# Patient Record
Sex: MALE | Race: WHITE | NOT HISPANIC OR LATINO | Employment: UNEMPLOYED | URBAN - METROPOLITAN AREA
[De-identification: names, ages, dates, MRNs, and addresses within clinical notes are randomized per-mention and may not be internally consistent; named-entity substitution may affect disease eponyms.]

---

## 2019-04-01 ENCOUNTER — OFFICE VISIT (OUTPATIENT)
Dept: FAMILY MEDICINE | Facility: CLINIC | Age: 29
End: 2019-04-01
Payer: COMMERCIAL

## 2019-04-01 VITALS
RESPIRATION RATE: 14 BRPM | WEIGHT: 227 LBS | HEART RATE: 80 BPM | HEIGHT: 73 IN | BODY MASS INDEX: 30.09 KG/M2 | OXYGEN SATURATION: 99 % | SYSTOLIC BLOOD PRESSURE: 156 MMHG | TEMPERATURE: 98.8 F | DIASTOLIC BLOOD PRESSURE: 110 MMHG

## 2019-04-01 DIAGNOSIS — R59.1 LYMPHADENOPATHY: ICD-10-CM

## 2019-04-01 DIAGNOSIS — R22.9 SUBCUTANEOUS NODULES: ICD-10-CM

## 2019-04-01 DIAGNOSIS — I10 ESSENTIAL HYPERTENSION, BENIGN: Primary | ICD-10-CM

## 2019-04-01 PROCEDURE — 99214 OFFICE O/P EST MOD 30 MIN: CPT | Performed by: FAMILY MEDICINE

## 2019-04-01 RX ORDER — ATENOLOL AND CHLORTHALIDONE TABLET 50; 25 MG/1; MG/1
1 TABLET ORAL DAILY
Qty: 30 TABLET | Refills: 1 | Status: SHIPPED | OUTPATIENT
Start: 2019-04-01 | End: 2019-07-05

## 2019-04-01 ASSESSMENT — MIFFLIN-ST. JEOR: SCORE: 2048.55

## 2019-04-01 ASSESSMENT — PAIN SCALES - GENERAL: PAINLEVEL: NO PAIN (0)

## 2019-04-01 NOTE — PROGRESS NOTES
SUBJECTIVE:   Wally Flores is a 28 year old male who presents to clinic today for the following health issues:    Hypertension Follow-up      Outpatient blood pressures are being checked at home.  Results are 150/110.    Low Salt Diet: no added salt      Amount of exercise or physical activity: 2-3 days/week for an average of greater than 60 minutes    Problems taking medications regularly: Not now not on meds because he didn't have insurance but now has insurance    Medication side effects: none    Diet: regular (no restrictions)      Wally is here for two concerns. First is hypertension. Was first diagnosed with hypertension about ten years ago. Was on a few different medications including propranolol, hydrochlorothiazide, and lisinopril but all at low doses. Has not been on medication for about a year due to lack of insurance. He has a strong family history of hypertension in both of his parents and all 3 of his brothers. No headaches, dizziness, or vision changes.  No chest pain or shortness of breath.  No leg swelling, orthopnea or dyspnea.  Does not check her blood pressure.    Also concerned about lumps on his head. The first is a lump at the front of his left ear. Has had it for three or four years. Fluctuates in size. Has been drained twice and spontaneously burst once. Is currently small in size but . No drainage currently. Also has had a lump on the right side of his neck under his chin for a few weeks.  No change in size or color.  Sore with palpation otherwise does not really bothered him.   Also has a small lump on the top of his head for about 2 years.  It also  gets larger and turns red at times. Currently it is small and firm. No fevers or chills.  No headache or dizziness.    Problem list and histories reviewed & adjusted, as indicated.  Additional history: as documented    Current Outpatient Medications   Medication Sig Dispense Refill     atenolol-chlorthalidone (TENORETIC)  "50-25 MG tablet Take 1 tablet by mouth daily 30 tablet 1     Allergies   Allergen Reactions     Penicillins Rash and Hives     Acetaminophen Rash       Reviewed and updated as needed this visit by clinical staff  Tobacco  Allergies  Meds  Soc Hx      Reviewed and updated as needed this visit by Provider         ROS:  Constitutional, HEENT, cardiovascular, pulmonary, gi and gu systems are negative, except as otherwise noted.    OBJECTIVE:     BP (!) 156/110 (BP Location: Left arm, Patient Position: Sitting, Cuff Size: Adult Regular)   Pulse 80   Temp 98.8  F (37.1  C) (Temporal)   Resp 14   Ht 1.854 m (6' 1\")   Wt 103 kg (227 lb)   SpO2 99%   BMI 29.95 kg/m    Body mass index is 29.95 kg/m .  GENERAL: healthy, alert and no distress  HENT: ear canals and TM's normal. Small fingertip sized fluctuant mass just anterior to left auricle, tender to palpation, no erythema or drainage.  Non-mobile. A small, less than 0.5 cm mass on top of scalp on the right side.  It is firm, non-tender to palpation, no erythema or drainage.  NECK: supple with no thyromegaly. Left sided shoddy lymphadenopathy, just below the mandible palpated.  RESP: lungs clear to auscultation - no rales, rhonchi or wheezes  CV: regular rate and rhythm, no murmur, no peripheral edema  MS: no gross musculoskeletal defects noted, no peripheral edema.  No focal weakness.    Diagnostic Test Results:  none    ASSESSMENT/PLAN:     1. Essential hypertension, benign  Patient with 10-year history of hypertension and strong family history of hypertension. Has been on multiple medications in the past including lisinopril, hydrochlorothiazide, and propranolol but all were at low doses. Didn't think they made any difference with his blood pressure. Started on atenolol/chlorthalidone with blood pressure re-check in about a week. If blood pressure is unchanged with the medication, will workup for other causes of hypertension including renal artery stenosis.  In " the meantime, recommend to work on weight loss, daily exercise, low-salt diet and avoid high caffeine intake.    - atenolol-chlorthalidone (TENORETIC) 50-25 MG tablet; Take 1 tablet by mouth daily  Dispense: 30 tablet; Refill: 1    2. Subcutaneous nodules  Patient with periauricular subcutaneous mass located just anterior to left auricle of 3 years duration. Per patient, has been drained twice and spontaneously drained once. 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 No signs of current infection. Refer to surgery for possible excision.  Also has subcutaneous mass in scalp on the right side of the top of his head that is small and firm to palpation. Likely an obstructed hair follicle. Reassure and monitor for now.       - GENERAL SURG ADULT REFERRAL    3. Lymphadenopathy  Patient with right-sided anterior cervical lymphadenopathy of a few weeks duration likely related to the periauricular subcutaneous mass. Expect to resolve. Call in if not resolve in the next couple weeks; will consider further evaluation with ultrasound or CT scan.     Aruna Srinivasan, MS3  [facility]    I, Aruna Srinivasan, am serving as a scribe; to document services personally performed by Tammie Driscoll Mai, MD- based on data collection and the provider's statements to me.      Tammie Driscoll Mai, MD  [facility]

## 2019-04-03 ENCOUNTER — OFFICE VISIT (OUTPATIENT)
Dept: SURGERY | Facility: CLINIC | Age: 29
End: 2019-04-03
Payer: COMMERCIAL

## 2019-04-03 VITALS
TEMPERATURE: 97.2 F | HEIGHT: 73 IN | BODY MASS INDEX: 29.69 KG/M2 | WEIGHT: 224 LBS | SYSTOLIC BLOOD PRESSURE: 114 MMHG | DIASTOLIC BLOOD PRESSURE: 62 MMHG

## 2019-04-03 DIAGNOSIS — R22.0 SUBCUTANEOUS MASS OF HEAD: Primary | ICD-10-CM

## 2019-04-03 PROCEDURE — 99243 OFF/OP CNSLTJ NEW/EST LOW 30: CPT | Performed by: SURGERY

## 2019-04-03 ASSESSMENT — MIFFLIN-ST. JEOR: SCORE: 2034.94

## 2019-04-03 NOTE — PROGRESS NOTES
Patient seen in consultation for recurrent infected skin cysts By Dr Paul    HPI:  Patient is a 28 year old male with several year history of scalp lumps that have waxed and waned in size and one that has had to be drained in the past due to infection. He has had a cyst removed from his back and a serious spinal abscess that required surgery many years ago. Currently the two spots that are recurrent and painful are on his left pre-auricular area and right frontal scalp. Per the patient these are not inflamed at the present time and he hasn't been on antibiotics for them for about an year.    Review Of Systems    Skin: as above  Ears/Nose/Throat: negative  Respiratory: No shortness of breath, dyspnea on exertion, cough, or hemoptysis  Cardiovascular: negative  Gastrointestinal: negative  Genitourinary: negative  Musculoskeletal: negative  Neurologic: negative  Hematologic/Lymphatic/Immunologic: negative  Endocrine: negative      Past Medical History:   Diagnosis Date     Herpes zoster without mention of complication 6th grade    left thorax     Hypertension      MEDICAL HISTORY OF -     Chromosomal [id number]     Ostium secundum type atrial septal defect     atrial septal defect     Other peripheral vascular disease(443.89)     History of acrocyanosis with circumoral pallor     Tuberculous abscess of spinal cord, 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    Staph aureus epidural abscess     Unspecified otitis media     Recurrent otitis     Varicella without mention of complication 1989       Past Surgical History:   Procedure Laterality Date     ADENOIDECTOMY  04/08/94     HC SPINAL PUNCTURE, THERAPEUTIC DRAINAGE CEREBROSPINAL FLUID  4/05    Drain Staph aureus epidural abscess     PE TUBES  04/08/94     TONSILLECTOMY  12/16/97     TYMPANOPLASTY, RT/LT  12/16/97    Tympanoplasty RT/LT       Family History   Problem Relation Age of Onset     Diabetes Mother      Hypertension Mother      Cancer Maternal Grandmother       Cancer - colorectal Maternal Grandfather      Cardiovascular Paternal Grandfather          at 32 from MI     No Known Problems Paternal Grandmother      Hypertension Brother      Coronary Artery Disease Father 50     Heart Disease Father      Hypertension Father      Diabetes Other         PGGM     Hypertension Brother      Hypertension Brother        Social History     Socioeconomic History     Marital status: Single     Spouse name: Not on file     Number of children: Not on file     Years of education: Not on file     Highest education level: Not on file   Occupational History     Occupation: student   Social Needs     Financial resource strain: Not on file     Food insecurity:     Worry: Not on file     Inability: Not on file     Transportation needs:     Medical: Not on file     Non-medical: Not on file   Tobacco Use     Smoking status: Never Smoker     Smokeless tobacco: Never Used   Substance and Sexual Activity     Alcohol use: Yes     Alcohol/week: 0.0 oz     Comment: 2 times per week./quit 15     Drug use: No     Sexual activity: Not Currently     Comment: Single.  not working.    Lifestyle     Physical activity:     Days per week: Not on file     Minutes per session: Not on file     Stress: Not on file   Relationships     Social connections:     Talks on phone: Not on file     Gets together: Not on file     Attends [employer]ism service: Not on file     Active member of club or organization: Not on file     Attends meetings of clubs or organizations: Not on file     Relationship status: Not on file     Intimate partner violence:     Fear of current or ex partner: Not on file     Emotionally abused: Not on file     Physically abused: Not on file     Forced sexual activity: Not on file   Other Topics Concern      Service No     Blood Transfusions No     Caffeine Concern No     Occupational Exposure No     Hobby Hazards No     Sleep Concern No     Stress Concern No     Weight Concern No      "Special Diet No     Back Care No     Exercise Yes     Bike Helmet No     Seat Belt Yes     Self-Exams Not Asked     Parent/sibling w/ CABG, MI or angioplasty before 65F 55M? Not Asked   Social History Narrative     Not on file       Current Outpatient Medications   Medication Sig Dispense Refill     atenolol-chlorthalidone (TENORETIC) 50-25 MG tablet Take 1 tablet by mouth daily 30 tablet 1       Medications and history reviewed    Physical exam:  Vitals: /62   Temp 97.2  F (36.2  C) (Temporal)   Ht 1.854 m (6' 1\")   Wt 101.6 kg (224 lb)   BMI 29.55 kg/m    BMI= Body mass index is 29.55 kg/m .    Constitutional: Healthy, alert, non-distressed   Head: Normo-cephalic, atraumatic, left pre-auricular area with firm small and non infected nodules, likely small cyst(s). Right frontal scalp with same, no signs of erythema or infection  Cardiovascular: RRR, no new murmurs, +S1, +S2 heart sounds, no clicks, rubs or gallops   Respiratory: CTAB, no rales, rhonchi or wheezing, equal chest rise, good respiratory effort   Gastrointestinal: Soft, non-tender, non distended, no rebound rigidity or guarding, no masses or hernias palpated   : Deferred  Musculoskeletal: Moves all extremities, normal  strength, no deformities noted   Skin: No suspicious lesions or rashes   Psychiatric: Mentation appears normal, affect appropriate   Hematologic/Lymphatic/Immunologic: Normal cervical and supraclavicular lymph nodes   Patient able to get up on table without difficulty.    Labs show:  No results found for this or any previous visit (from the past 24 hour(s)).      Assessment:     ICD-10-CM    1. Subcutaneous mass of head R22.0      Plan: He would like these two problematic jennifer excised. We can accommodate this in the office. He will return next week for in office excision of the two masses. He will call if he notices any erythema warmth or growth of these two areas.    Gadiel Yung, DO    "

## 2019-04-03 NOTE — LETTER
4/3/2019         RE: Wally Flores  [address] [address]        Dear Colleague,    Thank you for referring your patient, Wally Flores, to the [facility]. Please see a copy of my visit note below.    Patient seen in consultation for recurrent infected skin cysts By Dr Paul    HPI:  Patient is a 28 year old male with several year history of scalp lumps that have waxed and waned in size and one that has had to be drained in the past due to infection. He has had a cyst removed from his back and a serious spinal abscess that required surgery many years ago. Currently the two spots that are recurrent and painful are on his left pre-auricular area and right frontal scalp. Per the patient these are not inflamed at the present time and he hasn't been on antibiotics for them for about an year.    Review Of Systems    Skin: as above  Ears/Nose/Throat: negative  Respiratory: No shortness of breath, dyspnea on exertion, cough, or hemoptysis  Cardiovascular: negative  Gastrointestinal: negative  Genitourinary: negative  Musculoskeletal: negative  Neurologic: negative  Hematologic/Lymphatic/Immunologic: negative  Endocrine: negative      Past Medical History:   Diagnosis Date     Herpes zoster without mention of complication 6th grade    left thorax     Hypertension      MEDICAL HISTORY OF -     Chromosomal [id number]     Ostium secundum type atrial septal defect     atrial septal defect     Other peripheral vascular disease(443.89)     History of acrocyanosis with circumoral pallor     Tuberculous abscess of spinal cord, 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    Staph aureus epidural abscess     Unspecified otitis media     Recurrent otitis     Varicella without mention of complication 1989       Past Surgical History:   Procedure Laterality Date     ADENOIDECTOMY  04/08/94     HC SPINAL PUNCTURE, THERAPEUTIC DRAINAGE CEREBROSPINAL FLUID  4/05    Drain Staph aureus epidural abscess      PE TUBES  94     TONSILLECTOMY  97     TYMPANOPLASTY, RT/LT  97    Tympanoplasty RT/LT       Family History   Problem Relation Age of Onset     Diabetes Mother      Hypertension Mother      Cancer Maternal Grandmother      Cancer - colorectal Maternal Grandfather      Cardiovascular Paternal Grandfather          at 32 from MI     No Known Problems Paternal Grandmother      Hypertension Brother      Coronary Artery Disease Father 50     Heart Disease Father      Hypertension Father      Diabetes Other         PGGM     Hypertension Brother      Hypertension Brother        Social History     Socioeconomic History     Marital status: Single     Spouse name: Not on file     Number of children: Not on file     Years of education: Not on file     Highest education level: Not on file   Occupational History     Occupation: student   Social Needs     Financial resource strain: Not on file     Food insecurity:     Worry: Not on file     Inability: Not on file     Transportation needs:     Medical: Not on file     Non-medical: Not on file   Tobacco Use     Smoking status: Never Smoker     Smokeless tobacco: Never Used   Substance and Sexual Activity     Alcohol use: Yes     Alcohol/week: 0.0 oz     Comment: 2 times per week./quit 15     Drug use: No     Sexual activity: Not Currently     Comment: Single.  not working.    Lifestyle     Physical activity:     Days per week: Not on file     Minutes per session: Not on file     Stress: Not on file   Relationships     Social connections:     Talks on phone: Not on file     Gets together: Not on file     Attends Hoahaoism service: Not on file     Active member of club or organization: Not on file     Attends meetings of clubs or organizations: Not on file     Relationship status: Not on file     Intimate partner violence:     Fear of current or ex partner: Not on file     Emotionally abused: Not on file     Physically abused: Not on file     Forced  "sexual activity: Not on file   Other Topics Concern      Service No     Blood Transfusions No     Caffeine Concern No     Occupational Exposure No     Hobby Hazards No     Sleep Concern No     Stress Concern No     Weight Concern No     Special Diet No     Back Care No     Exercise Yes     Bike Helmet No     Seat Belt Yes     Self-Exams Not Asked     Parent/sibling w/ CABG, MI or angioplasty before 65F 55M? Not Asked   Social History Narrative     Not on file       Current Outpatient Medications   Medication Sig Dispense Refill     atenolol-chlorthalidone (TENORETIC) 50-25 MG tablet Take 1 tablet by mouth daily 30 tablet 1       Medications and history reviewed    Physical exam:  Vitals: /62   Temp 97.2  F (36.2  C) (Temporal)   Ht 1.854 m (6' 1\")   Wt 101.6 kg (224 lb)   BMI 29.55 kg/m     BMI= Body mass index is 29.55 kg/m .    Constitutional: Healthy, alert, non-distressed   Head: Normo-cephalic, atraumatic, left pre-auricular area with firm small and non infected nodules, likely small cyst(s). Right frontal scalp with same, no signs of erythema or infection  Cardiovascular: RRR, no new murmurs, +S1, +S2 heart sounds, no clicks, rubs or gallops   Respiratory: CTAB, no rales, rhonchi or wheezing, equal chest rise, good respiratory effort   Gastrointestinal: Soft, non-tender, non distended, no rebound rigidity or guarding, no masses or hernias palpated   : Deferred  Musculoskeletal: Moves all extremities, normal  strength, no deformities noted   Skin: No suspicious lesions or rashes   Psychiatric: Mentation appears normal, affect appropriate   Hematologic/Lymphatic/Immunologic: Normal cervical and supraclavicular lymph nodes   Patient able to get up on table without difficulty.    Labs show:  No results found for this or any previous visit (from the past 24 hour(s)).      Assessment:     ICD-10-CM    1. Subcutaneous mass of head R22.0      Plan: He would like these two problematic jennifer " excised. We can accommodate this in the office. He will return next week for in office excision of the two masses. He will call if he notices any erythema warmth or growth of these two areas.    Gadiel Yung, DO      Again, thank you for allowing me to participate in the care of your patient.        Sincerely,        Gadiel Yung, DO

## 2019-04-08 ENCOUNTER — ALLIED HEALTH/NURSE VISIT (OUTPATIENT)
Dept: FAMILY MEDICINE | Facility: CLINIC | Age: 29
End: 2019-04-08

## 2019-04-08 VITALS — SYSTOLIC BLOOD PRESSURE: 120 MMHG | DIASTOLIC BLOOD PRESSURE: 82 MMHG

## 2019-04-08 DIAGNOSIS — Z01.30 BLOOD PRESSURE CHECK: Primary | ICD-10-CM

## 2019-04-08 PROCEDURE — 99207 ZZC NO CHARGE NURSE ONLY: CPT

## 2019-04-08 NOTE — PROGRESS NOTES
Wally Flores is a 28 year old year old patient who comes in today for a Blood Pressure check because of ongoing blood pressure monitoring.    Patient is taking medication as prescribed  Patient is tolerating medications well.  Patient is not monitoring Blood Pressure at home.   Current complaints: tiredness due to medication    BP Readings from Last 3 Encounters:   04/08/19 120/82   04/03/19 114/62   04/01/19 (!) 156/110     Aleyda Zepeda CMA

## 2019-04-19 ENCOUNTER — TELEPHONE (OUTPATIENT)
Dept: FAMILY MEDICINE | Facility: CLINIC | Age: 29
End: 2019-04-19

## 2019-04-19 ENCOUNTER — OFFICE VISIT (OUTPATIENT)
Dept: SURGERY | Facility: CLINIC | Age: 29
End: 2019-04-19
Payer: COMMERCIAL

## 2019-04-19 VITALS
WEIGHT: 220 LBS | BODY MASS INDEX: 29.16 KG/M2 | SYSTOLIC BLOOD PRESSURE: 136 MMHG | TEMPERATURE: 97.2 F | DIASTOLIC BLOOD PRESSURE: 82 MMHG | HEIGHT: 73 IN

## 2019-04-19 DIAGNOSIS — R22.0 SUBCUTANEOUS MASS OF HEAD: Primary | ICD-10-CM

## 2019-04-19 PROCEDURE — 88304 TISSUE EXAM BY PATHOLOGIST: CPT | Mod: TC | Performed by: SURGERY

## 2019-04-19 PROCEDURE — 11421 EXC H-F-NK-SP B9+MARG 0.6-1: CPT | Mod: 51 | Performed by: SURGERY

## 2019-04-19 PROCEDURE — 11442 EXC FACE-MM B9+MARG 1.1-2 CM: CPT | Performed by: SURGERY

## 2019-04-19 ASSESSMENT — MIFFLIN-ST. JEOR: SCORE: 2011.79

## 2019-04-19 NOTE — PROGRESS NOTES
PROCEDURE: excision of subcutaneous mass of the left cheek and right frontal scalp   Written consent was obtained    The left cheek area was prepped and appropriately anesthetized with 1% lidocaine with epinephrine. Using the usual technique, excision of subcutaneous mass was performed. The total area excised, including lesion and margins was 2cm x 1cm x 1cm.  Closure was accomplished with interrupted 3-0 vicryl for the dermal layer and running subcuticular 4-0 monocryl for the skin. Total length of the incision after closure was 2.5 cm. An appropriate  dressing was applied.  The procedure was well tolerated and without complications. Specimen was sent to Pathology.    The right frontal scalp area was prepped and appropriately anesthetized with 1% lidocaine with epinephrine. Using the usual technique, excision of subcutaneous mass was performed. The total area excised, including lesion and margins was 0.8 cm.  Closure was accomplished with interrupted 3-0 vicryl for the dermal layer and two inverted subcuticular 4-0 monocryl for the skin. Total length of the incision after closure was 1 cm. An appropriate  dressing was applied.  The procedure was well tolerated and without complications. Specimen was sent to Pathology.

## 2019-04-19 NOTE — TELEPHONE ENCOUNTER
Wally Flores is a 29 year old male who calls with pain after surgical procedure from this morning's visit with Dr. Yung.  Patient states that he was feeling no pain until approximately 2-3 hours ago, rating 6-8/10 on jaw line near ear area.  Patient states surgical procedure on skull is not painful.  Patient denies rash, fever, headache, signs of infection, change in vision.  Advised patient to continue use of ibuprofen, ice and position head to help with pressure on surgical area.  Advised patient to seek emergency care for worsening pain and/or increasing symptoms.  Patient states understanding.      NURSING ASSESSMENT:  Description:  Pain after procedure  Onset/duration:  2-3 hours  Precip. factors:  Stated above  Associated symptoms:  Stated above  Improves/worsens symptoms:  Ibuprofen not helping too much at this time  Pain scale (0-10)   6-8/10    Allergies:   Allergies   Allergen Reactions     Penicillins Rash and Hives     Acetaminophen Rash       NURSING PLAN: Nursing advice to patient pain relief measurements, ER for worsening pain and/or symptoms    RECOMMENDED DISPOSITION:  Home care advice -   Will comply with recommendation: Yes  If further questions/concerns or if symptoms do not improve, worsen or new symptoms develop, call your PCP or [facility] as soon as possible.      Guideline used:  Facial problems, Postoperative Problems  Telephone Triage Protocols for Nurses, Fifth Edition, Stacy Deluca RN

## 2019-04-23 LAB — COPATH REPORT: NORMAL

## 2019-07-05 DIAGNOSIS — I10 ESSENTIAL HYPERTENSION, BENIGN: ICD-10-CM

## 2019-07-05 RX ORDER — ATENOLOL AND CHLORTHALIDONE TABLET 50; 25 MG/1; MG/1
TABLET ORAL
Qty: 30 TABLET | Refills: 5 | Status: SHIPPED | OUTPATIENT
Start: 2019-07-05 | End: 2020-01-07

## 2019-09-28 ENCOUNTER — HEALTH MAINTENANCE LETTER (OUTPATIENT)
Age: 29
End: 2019-09-28

## 2019-11-19 ENCOUNTER — APPOINTMENT (OUTPATIENT)
Dept: CT IMAGING | Facility: CLINIC | Age: 29
End: 2019-11-19
Attending: EMERGENCY MEDICINE
Payer: COMMERCIAL

## 2019-11-19 ENCOUNTER — HOSPITAL ENCOUNTER (EMERGENCY)
Facility: CLINIC | Age: 29
Discharge: HOME | End: 2019-11-19
Attending: EMERGENCY MEDICINE | Admitting: EMERGENCY MEDICINE
Payer: COMMERCIAL

## 2019-11-19 VITALS
HEIGHT: 73 IN | HEART RATE: 101 BPM | BODY MASS INDEX: 30.75 KG/M2 | SYSTOLIC BLOOD PRESSURE: 170 MMHG | OXYGEN SATURATION: 96 % | TEMPERATURE: 99.1 F | WEIGHT: 232 LBS | RESPIRATION RATE: 16 BRPM | DIASTOLIC BLOOD PRESSURE: 108 MMHG

## 2019-11-19 DIAGNOSIS — S06.0X9A CONCUSSION WITH LOSS OF CONSCIOUSNESS, INITIAL ENCOUNTER: ICD-10-CM

## 2019-11-19 DIAGNOSIS — G44.309 POST-CONCUSSION HEADACHE: ICD-10-CM

## 2019-11-19 DIAGNOSIS — S09.90XA CLOSED HEAD INJURY, INITIAL ENCOUNTER: ICD-10-CM

## 2019-11-19 PROCEDURE — 70450 CT HEAD/BRAIN W/O DYE: CPT

## 2019-11-19 PROCEDURE — 96365 THER/PROPH/DIAG IV INF INIT: CPT | Performed by: EMERGENCY MEDICINE

## 2019-11-19 PROCEDURE — 96361 HYDRATE IV INFUSION ADD-ON: CPT | Performed by: EMERGENCY MEDICINE

## 2019-11-19 PROCEDURE — 25000128 H RX IP 250 OP 636: Performed by: EMERGENCY MEDICINE

## 2019-11-19 PROCEDURE — 99285 EMERGENCY DEPT VISIT HI MDM: CPT | Mod: 25 | Performed by: EMERGENCY MEDICINE

## 2019-11-19 PROCEDURE — 25800030 ZZH RX IP 258 OP 636: Performed by: EMERGENCY MEDICINE

## 2019-11-19 PROCEDURE — 96375 TX/PRO/DX INJ NEW DRUG ADDON: CPT | Performed by: EMERGENCY MEDICINE

## 2019-11-19 PROCEDURE — 99284 EMERGENCY DEPT VISIT MOD MDM: CPT | Mod: Z6 | Performed by: EMERGENCY MEDICINE

## 2019-11-19 RX ORDER — DEXAMETHASONE SODIUM PHOSPHATE 10 MG/ML
10 INJECTION, SOLUTION INTRAMUSCULAR; INTRAVENOUS ONCE
Status: COMPLETED | OUTPATIENT
Start: 2019-11-19 | End: 2019-11-19

## 2019-11-19 RX ORDER — MAGNESIUM SULFATE 1 G/100ML
1 INJECTION INTRAVENOUS ONCE
Status: COMPLETED | OUTPATIENT
Start: 2019-11-19 | End: 2019-11-19

## 2019-11-19 RX ORDER — KETOROLAC TROMETHAMINE 30 MG/ML
30 INJECTION, SOLUTION INTRAMUSCULAR; INTRAVENOUS ONCE
Status: COMPLETED | OUTPATIENT
Start: 2019-11-19 | End: 2019-11-19

## 2019-11-19 RX ADMIN — MAGNESIUM SULFATE HEPTAHYDRATE 1 G: 1 INJECTION, SOLUTION INTRAVENOUS at 15:27

## 2019-11-19 RX ADMIN — SODIUM CHLORIDE 1000 ML: 9 INJECTION, SOLUTION INTRAVENOUS at 15:22

## 2019-11-19 RX ADMIN — KETOROLAC TROMETHAMINE 30 MG: 30 INJECTION, SOLUTION INTRAMUSCULAR at 15:24

## 2019-11-19 RX ADMIN — DEXAMETHASONE SODIUM PHOSPHATE 10 MG: 10 INJECTION, SOLUTION INTRAMUSCULAR; INTRAVENOUS at 15:25

## 2019-11-19 ASSESSMENT — MIFFLIN-ST. JEOR: SCORE: 2066.23

## 2019-11-19 NOTE — ED AVS SNAPSHOT
[facility] Emergency Department  [address] [place] [address]  Phone:  [phone]  Fax:  [phone]                                    Wally Flores   MRN: [id number]    Department:  [facility] Emergency Department   Date of Visit:  11/19/2019           After Visit Summary Signature Page    I have received my discharge instructions, and my questions have been answered. I have discussed any challenges I see with this plan with the nurse or doctor.    ..........................................................................................................................................  Patient/Patient Representative Signature      ..........................................................................................................................................  Patient Representative Print Name and Relationship to Patient    ..................................................               ................................................  Date                                   Time    ..........................................................................................................................................  Reviewed by Signature/Title    ...................................................              ..............................................  Date                                               Time          22EPIC Rev 08/18

## 2019-11-19 NOTE — LETTER
November 20, 2019      To Whom It May Concern:      Wally Flores was seen in our Emergency Department today, 11/19/19.  He will have follow-up this week and can return to work after cleared.    Sincerely,        Kim Hanna MD

## 2019-11-19 NOTE — LETTER
November 19, 2019      To Whom It May Concern:      Wally Flores was seen in our Emergency Department today, 11/19/19.  I expect his condition to improve.      Sincerely,        Kim Hanna MD
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
## 2019-11-19 NOTE — DISCHARGE INSTRUCTIONS
Continue to drink lots of fluids and get plenty of rest.    Follow-up in the concussion clinic as scheduled.    Okay to use over-the-counter Tylenol, ibuprofen or Aleve for headache pain.    Avoid significant screen time.    Return for significant worsening, changes or concerns.    I hope you start to improve very quickly and enjoy your holidays!!

## 2019-11-22 ENCOUNTER — OFFICE VISIT (OUTPATIENT)
Dept: ORTHOPEDICS | Facility: CLINIC | Age: 29
End: 2019-11-22
Payer: COMMERCIAL

## 2019-11-22 VITALS
WEIGHT: 238.5 LBS | BODY MASS INDEX: 31.61 KG/M2 | HEIGHT: 73 IN | DIASTOLIC BLOOD PRESSURE: 82 MMHG | SYSTOLIC BLOOD PRESSURE: 150 MMHG

## 2019-11-22 DIAGNOSIS — S06.0X0A CONCUSSION WITHOUT LOSS OF CONSCIOUSNESS, INITIAL ENCOUNTER: Primary | ICD-10-CM

## 2019-11-22 PROCEDURE — 99204 OFFICE O/P NEW MOD 45 MIN: CPT | Performed by: PHYSICAL MEDICINE & REHABILITATION

## 2019-11-22 ASSESSMENT — MIFFLIN-ST. JEOR: SCORE: 2095.71

## 2019-11-22 NOTE — PATIENT INSTRUCTIONS
Today's Plan of Care:  -Avoid intense physical activity, activities with the risk of falling, or contact sports. Okay to do light walking.  -Limit screen time: computers, iPads, cell phones, video games, TV  -Rest physically and cognitively. Avoid things that worsen symptoms.  -Work as tolerated beginning 11/24/19. Letter provided.    Follow Up: Approximately 10 days or sooner if symptoms fail to improve or worsen. Please call with any questions or concerns.     Healing After a Concussion     Rest  Rest is the best treatment for a concussion. You should avoid activities that cause your symptoms to get worse or make you feel tired. This would include physical activities as well as watching TV, texting or playing video games.    You may sleep or nap during the day as long as it does not prevent you from sleeping at night. If you find it is hard to fall asleep, talk to your doctor. You may need medicine to help you sleep.    If symptoms have not worsened, you do not need to be wakened and checked on during the night.      School  You can rest your brain by staying at home for a time. The amount of time away from school will depend on the injury and the symptoms.    At school, you may have trouble taking tests or working on a computer. Symptoms may get worse in band, choir, busy classes or a noisy lunchroom. A doctor can work with the school if you need a plan to help you succeed.    Work  You may need to change your work routine as you recover. A doctor can help you create a plan for the conditions at your job.      Treat pain    Take Tylenol (acetaminophen) for headaches and pain every 4 to 6 hours, as needed.    Do not take over-the-counter medicines such as ibuprofen, Advil, Motrin, Benadryl, Aleve, sleep aides or Tylenol PM. These drugs may cause new problems.    If you cannot manage your pain with Tylenol, call your doctor or go to the emergency department.      Watch symptoms closely  Each day keep track of your  "symptoms. This will help your doctor see how well you are healing. Write down the symptom, how often it occurs, how long it lasts, and what makes it better or worse.    Possible symptoms: headache, stomach upset, feeling confused or dizzy, motion sickness, and personality changes.      Returning to activity  Take your time returning to activity. A doctor can help determine what levels of activity are best for you. If you re returning to a sport, you should see a healthcare provider before doing so.      If you have questions, call  Concussion hotline: [phone] or Athletic medicine hotline: [phone].          For informational purposes only.  Not to replace the advice of your health care provider.  Copyright   2014 [facility].  All rights reserved.            Sleep Hygiene     What is it?    \"Sleep hygiene\" means having good sleep habits. Follow the tips below to sleep better at night.      Get on a schedule. Go to bed and get up at about the same time every day.    Listen to your body. Only try to sleep when you actually feel tired or sleepy.    Be patient. If you haven't been able to get to sleep after about 20 minutes or more, get up and do something calming or boring until you feel sleepy. Then, return to bed and try again.      Avoid caffeine (coffee, tea, cola drinks, chocolate and some medicines) for at least 4 to 6 hours before going to bed. We also suggest you don't use alcohol or nicotine (cigarettes) during this time. Both can make it harder for you to fall asleep and stay asleep.    Use your bed for sleeping only. That means no TV, computer or homework in bed!    Don't nap during the day. If you do nap, make sure it is for less than an hour and before 3 p.m.    Create sleep rituals that remind your body that it is time to sleep. Examples include breathing exercises, stretching, or reading a book.     Try a bath or shower before bed. Having a hot bath 1 to 2 hours before bedtime can " "help you feel sleepy.    Don't watch the clock. Checking the clock during the night can wake you up. It can also lead to negative thoughts such as \"I will never fall asleep.\"    Use a sleep diary. Track your sleep schedule to know your sleep patterns and to see where you can improve.    Get regular exercise. But try not to do heavy exercise in the 4 hours before bedtime.      Eat a healthy, balanced diet. Try eating a light, healthy snack before bed, but avoid eating a heavy meal.    Create the right sleeping area. A cool, dark, quiet room is best. If needed, try earplugs, fans and blackout curtains.      Keep your daytime routine the same even if you have a bad night sleep. Avoiding activities the next day can make it harder to sleep.          For informational purposes only. Not to replace the advice of your health care provider. Copyright   2013 [employer] [facility]. All rights reserved.    "
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
## 2019-12-17 ENCOUNTER — TELEPHONE (OUTPATIENT)
Dept: FAMILY MEDICINE | Facility: CLINIC | Age: 29
End: 2019-12-17

## 2019-12-17 NOTE — TELEPHONE ENCOUNTER
Panel Management Review      Patient has the following on his problem list:     Hypertension   Last three blood pressure readings:  BP Readings from Last 3 Encounters:   11/22/19 (!) 150/82   11/19/19 (!) 170/108   04/19/19 136/82     Blood pressure: FAILED    HTN Guidelines:  Less than 140/90      Composite cancer screening  Chart review shows that this patient is due/due soon for the following None  Summary:    Patient is due/failing the following:   BP CHECK    Action needed:   Patient needs office visit for bp check with Dr. Paul.    Type of outreach:    Phone, left message for patient to call back.     Questions for provider review:    None                                                                                                                                    Davida Biggs CMA      Chart routed to Care Team .

## 2019-12-19 NOTE — TELEPHONE ENCOUNTER
Wally returns call and message is relayed.  An appointment is made for Wally for blood pressure check with Dr Paul.

## 2020-01-06 ENCOUNTER — TELEPHONE (OUTPATIENT)
Dept: FAMILY MEDICINE | Facility: CLINIC | Age: 30
End: 2020-01-06

## 2020-01-06 NOTE — TELEPHONE ENCOUNTER
Reason for Call:  Same Day Appointment, Requested Provider:  Tammie Paul MD     PCP: Tammie Paul    Reason for visit: BP Check    Duration of symptoms:     Have you been treated for this in the past? Yes    Additional comments: Wally has an appointment tomorrow 01/07 at 1:00pm.  Wally is asking if Dr Paul would see him earlier tomorrow.  He has another appointment in the afternoon that conflicts with this appointment and he cannot change or miss that one, but he also states he needs to see Dr Paul tomorrow also.    Can we leave a detailed message on this number? YES    Phone number patient can be reached at: Home number on file [phone] (home)    Best Time: any    Call taken on 1/6/2020 at 12:51 PM by Lauren Lee

## 2020-01-06 NOTE — TELEPHONE ENCOUNTER
Patient was called and his appointment time was changed.  No further action is needed as of right now.     Davida Biggs, CMA

## 2020-01-07 ENCOUNTER — OFFICE VISIT (OUTPATIENT)
Dept: FAMILY MEDICINE | Facility: CLINIC | Age: 30
End: 2020-01-07
Payer: COMMERCIAL

## 2020-01-07 VITALS
SYSTOLIC BLOOD PRESSURE: 156 MMHG | HEIGHT: 73 IN | OXYGEN SATURATION: 97 % | WEIGHT: 231.2 LBS | HEART RATE: 100 BPM | RESPIRATION RATE: 16 BRPM | DIASTOLIC BLOOD PRESSURE: 96 MMHG | BODY MASS INDEX: 30.64 KG/M2 | TEMPERATURE: 96.3 F

## 2020-01-07 DIAGNOSIS — F41.1 GENERALIZED ANXIETY DISORDER: ICD-10-CM

## 2020-01-07 DIAGNOSIS — G43.109 MIGRAINE WITH AURA AND WITHOUT STATUS MIGRAINOSUS, NOT INTRACTABLE: ICD-10-CM

## 2020-01-07 DIAGNOSIS — I10 ESSENTIAL HYPERTENSION, BENIGN: Primary | ICD-10-CM

## 2020-01-07 LAB
ANION GAP SERPL CALCULATED.3IONS-SCNC: 5 MMOL/L (ref 3–14)
BUN SERPL-MCNC: 21 MG/DL (ref 7–30)
CALCIUM SERPL-MCNC: 9.3 MG/DL (ref 8.5–10.1)
CHLORIDE SERPL-SCNC: 104 MMOL/L (ref 94–109)
CO2 SERPL-SCNC: 28 MMOL/L (ref 20–32)
CREAT SERPL-MCNC: 0.96 MG/DL (ref 0.66–1.25)
GFR SERPL CREATININE-BSD FRML MDRD: >90 ML/MIN/{1.73_M2}
GLUCOSE SERPL-MCNC: 100 MG/DL (ref 70–99)
POTASSIUM SERPL-SCNC: 3.4 MMOL/L (ref 3.4–5.3)
SODIUM SERPL-SCNC: 137 MMOL/L (ref 133–144)

## 2020-01-07 PROCEDURE — 36415 COLL VENOUS BLD VENIPUNCTURE: CPT | Performed by: FAMILY MEDICINE

## 2020-01-07 PROCEDURE — 99214 OFFICE O/P EST MOD 30 MIN: CPT | Performed by: FAMILY MEDICINE

## 2020-01-07 PROCEDURE — 80048 BASIC METABOLIC PNL TOTAL CA: CPT | Performed by: FAMILY MEDICINE

## 2020-01-07 RX ORDER — CITALOPRAM HYDROBROMIDE 10 MG/1
TABLET ORAL
Qty: 60 TABLET | Refills: 0 | Status: SHIPPED | OUTPATIENT
Start: 2020-01-07 | End: 2020-02-18

## 2020-01-07 RX ORDER — ATENOLOL AND CHLORTHALIDONE TABLET 50; 25 MG/1; MG/1
1 TABLET ORAL DAILY
Qty: 90 TABLET | Refills: 1 | Status: SHIPPED | OUTPATIENT
Start: 2020-01-07 | End: 2020-04-03

## 2020-01-07 RX ORDER — LISINOPRIL 10 MG/1
10 TABLET ORAL DAILY
Qty: 30 TABLET | Refills: 1 | Status: SHIPPED | OUTPATIENT
Start: 2020-01-07 | End: 2020-04-03

## 2020-01-07 RX ORDER — SUMATRIPTAN 50 MG/1
50 TABLET, FILM COATED ORAL
Qty: 12 TABLET | Refills: 1 | Status: SHIPPED | OUTPATIENT
Start: 2020-01-07 | End: 2020-04-03

## 2020-01-07 ASSESSMENT — MIFFLIN-ST. JEOR: SCORE: 2062.6

## 2020-01-07 ASSESSMENT — PAIN SCALES - GENERAL: PAINLEVEL: NO PAIN (0)

## 2020-01-07 NOTE — LETTER
[address] [address]  Phone: [phone]  Fax: [phone]    January 7, 2020        Wally Flores  [address] [facility] [address]          To whom it may concern:    RE: Wally Flores    Due to an migraine, patient missed work from December 29 2019 through January 2, 2020. He is on medication for future migraines. Patient was seen and treated today at our clinic.    Please contact me for questions or concerns.      Sincerely,        Tammie Driscoll Mai, MD

## 2020-01-07 NOTE — NURSING NOTE
Patient wanted us to write a in-depth letter to his work why he missed work. With why he was gone and that he is on future medication for it.  OK to write letter   Per Dr Paul.    MP/MA

## 2020-01-07 NOTE — LETTER
[address] [address]  Phone: [phone]  Fax: [phone]    January 7, 2020        Wally Flores  [address] [facility] [address]          To whom it may concern:    RE: Wally Flores    Due to an illness patient missed work from December 29-January 2, 2020.  Patient was seen and treated today at our clinic.    Please contact me for questions or concerns.      Sincerely,        Tammie Driscoll Mai, MD, jrm, cma
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
## 2020-02-18 ENCOUNTER — MYC REFILL (OUTPATIENT)
Dept: FAMILY MEDICINE | Facility: CLINIC | Age: 30
End: 2020-02-18

## 2020-02-18 DIAGNOSIS — F41.1 GENERALIZED ANXIETY DISORDER: ICD-10-CM

## 2020-02-18 DIAGNOSIS — G43.109 MIGRAINE WITH AURA AND WITHOUT STATUS MIGRAINOSUS, NOT INTRACTABLE: ICD-10-CM

## 2020-02-18 DIAGNOSIS — I10 ESSENTIAL HYPERTENSION, BENIGN: ICD-10-CM

## 2020-02-18 RX ORDER — CITALOPRAM HYDROBROMIDE 10 MG/1
TABLET ORAL
Qty: 60 TABLET | Refills: 1 | Status: SHIPPED | OUTPATIENT
Start: 2020-02-18 | End: 2020-04-03

## 2020-02-18 RX ORDER — SUMATRIPTAN 50 MG/1
50 TABLET, FILM COATED ORAL
Qty: 12 TABLET | Refills: 1 | Status: CANCELLED | OUTPATIENT
Start: 2020-02-18

## 2020-02-18 RX ORDER — LISINOPRIL 10 MG/1
10 TABLET ORAL DAILY
Qty: 30 TABLET | Refills: 1 | Status: CANCELLED | OUTPATIENT
Start: 2020-02-18

## 2020-02-18 RX ORDER — CITALOPRAM HYDROBROMIDE 10 MG/1
TABLET ORAL
Qty: 60 TABLET | Refills: 0 | Status: CANCELLED | OUTPATIENT
Start: 2020-02-18

## 2020-02-18 NOTE — TELEPHONE ENCOUNTER
"Celexa  Last Written Prescription Date:  01/07/2020  Last Fill Quantity: 60,  # refills: 0   Last office visit: 1/7/2020 with prescribing provider:  Beverly   Future Office Visit:  None  Prescription approved per [facility] Refill Protocol.    Requested Prescriptions   Pending Prescriptions Disp Refills     citalopram 10 MG PO tablet [Pharmacy Med Name: CITALOPRAM HYDROBROMIDE 10MG TABS] 60 tablet 0     Sig: TAKE ONE TABLET BY MOUTH ONCE DAILY FOR 2 WEEKS, THEN INCREASE TO 2 TABLETS DAILY       SSRIs Protocol Passed - 2/18/2020  2:00 PM        Passed - Recent (12 mo) or future (30 days) visit within the authorizing provider's specialty     Patient has had an office visit with the authorizing provider or a provider within the authorizing providers department within the previous 12 mos or has a future within next 30 days. See \"Patient Info\" tab in inbasket, or \"Choose Columns\" in Meds & Orders section of the refill encounter.          Passed - Medication is active on med list        Passed - Patient is age 18 or older      Vikki Bowie RN   "

## 2020-03-25 ENCOUNTER — NURSE TRIAGE (OUTPATIENT)
Dept: NURSING | Facility: CLINIC | Age: 30
End: 2020-03-25

## 2020-03-25 NOTE — TELEPHONE ENCOUNTER
"Blood pressure was high since Monday, 190/102. Today it is higher and he is beginning to have symptoms.   206/128  Repeated while on phone 206/109.  Brother is there to drive him to the emergency room.     Reason for Disposition    [1] Systolic BP  >= 160 OR Diastolic >= 100 AND [2] cardiac or neurologic symptoms (e.g., chest pain, difficulty breathing, unsteady gait, blurred vision)    Answer Assessment - Initial Assessment Questions  1. BLOOD PRESSURE: \"What is the blood pressure?\" \"Did you take at least two measurements 5 minutes apart?\"      206/128 206/109  2. ONSET: \"When did you take your blood pressure?\"      Just rechecked  3. HOW: \"How did you obtain the blood pressure?\" (e.g., visiting nurse, automatic home BP monitor)      Automatic home bp machine  4. HISTORY: \"Do you have a history of high blood pressure?\"      yes  5. MEDICATIONS: \"Are you taking any medications for blood pressure?\" \"Have you missed any doses recently?\"      Yes, not within a week  6. OTHER SYMPTOMS: \"Do you have any symptoms?\" (e.g., headache, chest pain, blurred vision, difficulty breathing, weakness)      Trouble walking up stairs due to difficulty breathing, right sided chest pain, weakness  7. PREGNANCY: \"Is there any chance you are pregnant?\" \"When was your last menstrual period?\"      no    Protocols used: HIGH BLOOD PRESSURE-A-    Brother will drive him to the emergency room.   Janice Colindres RN on 3/25/2020 at 3:12 PM    "

## 2020-03-26 ENCOUNTER — HOSPITAL ENCOUNTER (EMERGENCY)
Facility: CLINIC | Age: 30
Discharge: HOME | End: 2020-03-26
Attending: EMERGENCY MEDICINE | Admitting: EMERGENCY MEDICINE
Payer: COMMERCIAL

## 2020-03-26 ENCOUNTER — APPOINTMENT (OUTPATIENT)
Dept: GENERAL RADIOLOGY | Facility: CLINIC | Age: 30
End: 2020-03-26
Attending: EMERGENCY MEDICINE
Payer: COMMERCIAL

## 2020-03-26 VITALS
RESPIRATION RATE: 16 BRPM | SYSTOLIC BLOOD PRESSURE: 162 MMHG | OXYGEN SATURATION: 97 % | WEIGHT: 220 LBS | DIASTOLIC BLOOD PRESSURE: 106 MMHG | HEART RATE: 83 BPM | BODY MASS INDEX: 29.03 KG/M2 | TEMPERATURE: 97.2 F

## 2020-03-26 DIAGNOSIS — J06.9 VIRAL URI WITH COUGH: ICD-10-CM

## 2020-03-26 DIAGNOSIS — I10 ACCELERATED HYPERTENSION: ICD-10-CM

## 2020-03-26 LAB
ANION GAP SERPL CALCULATED.3IONS-SCNC: 8 MMOL/L (ref 3–14)
BASOPHILS # BLD AUTO: 0.1 10E9/L (ref 0–0.2)
BASOPHILS NFR BLD AUTO: 1.7 %
BUN SERPL-MCNC: 13 MG/DL (ref 7–30)
CALCIUM SERPL-MCNC: 8.7 MG/DL (ref 8.5–10.1)
CHLORIDE SERPL-SCNC: 104 MMOL/L (ref 94–109)
CO2 SERPL-SCNC: 25 MMOL/L (ref 20–32)
CREAT SERPL-MCNC: 0.86 MG/DL (ref 0.66–1.25)
DIFFERENTIAL METHOD BLD: ABNORMAL
EOSINOPHIL NFR BLD AUTO: 1.7 %
ERYTHROCYTE [DISTWIDTH] IN BLOOD BY AUTOMATED COUNT: 13 % (ref 10–15)
FLUAV+FLUBV AG SPEC QL: NEGATIVE
FLUAV+FLUBV AG SPEC QL: NEGATIVE
GFR SERPL CREATININE-BSD FRML MDRD: >90 ML/MIN/{1.73_M2}
GLUCOSE SERPL-MCNC: 96 MG/DL (ref 70–99)
HCT VFR BLD AUTO: 44.7 % (ref 40–53)
HGB BLD-MCNC: 15.1 G/DL (ref 13.3–17.7)
IMM GRANULOCYTES # BLD: 0 10E9/L (ref 0–0.4)
IMM GRANULOCYTES NFR BLD: 0 %
LYMPHOCYTES # BLD AUTO: 1 10E9/L (ref 0.8–5.3)
LYMPHOCYTES NFR BLD AUTO: 29.2 %
MCH RBC QN AUTO: 32.7 PG (ref 26.5–33)
MCHC RBC AUTO-ENTMCNC: 33.8 G/DL (ref 31.5–36.5)
MCV RBC AUTO: 97 FL (ref 78–100)
MONOCYTES # BLD AUTO: 0.4 10E9/L (ref 0–1.3)
MONOCYTES NFR BLD AUTO: 10.7 %
NEUTROPHILS # BLD AUTO: 2 10E9/L (ref 1.6–8.3)
NEUTROPHILS NFR BLD AUTO: 56.7 %
NRBC # BLD AUTO: 0 10*3/UL
NRBC BLD AUTO-RTO: 0 /100
PLATELET # BLD AUTO: 238 10E9/L (ref 150–450)
POTASSIUM SERPL-SCNC: 3.5 MMOL/L (ref 3.4–5.3)
RBC # BLD AUTO: 4.62 10E12/L (ref 4.4–5.9)
SODIUM SERPL-SCNC: 137 MMOL/L (ref 133–144)
SPECIMEN SOURCE: NORMAL
TROPONIN I SERPL-MCNC: 0.02 UG/L (ref 0–0.04)
WBC # BLD AUTO: 3.6 10E9/L (ref 4–11)

## 2020-03-26 PROCEDURE — 87804 INFLUENZA ASSAY W/OPTIC: CPT | Performed by: EMERGENCY MEDICINE

## 2020-03-26 PROCEDURE — 71045 X-RAY EXAM CHEST 1 VIEW: CPT | Mod: TC

## 2020-03-26 PROCEDURE — 25000132 ZZH RX MED GY IP 250 OP 250 PS 637: Performed by: EMERGENCY MEDICINE

## 2020-03-26 PROCEDURE — 25000128 H RX IP 250 OP 636: Performed by: EMERGENCY MEDICINE

## 2020-03-26 PROCEDURE — 93010 ELECTROCARDIOGRAM REPORT: CPT | Mod: Z6 | Performed by: EMERGENCY MEDICINE

## 2020-03-26 PROCEDURE — 93005 ELECTROCARDIOGRAM TRACING: CPT | Performed by: EMERGENCY MEDICINE

## 2020-03-26 PROCEDURE — 25800030 ZZH RX IP 258 OP 636: Performed by: EMERGENCY MEDICINE

## 2020-03-26 PROCEDURE — 99285 EMERGENCY DEPT VISIT HI MDM: CPT | Mod: 25 | Performed by: EMERGENCY MEDICINE

## 2020-03-26 PROCEDURE — 85025 COMPLETE CBC W/AUTO DIFF WBC: CPT | Performed by: EMERGENCY MEDICINE

## 2020-03-26 PROCEDURE — 84484 ASSAY OF TROPONIN QUANT: CPT | Performed by: EMERGENCY MEDICINE

## 2020-03-26 PROCEDURE — 96361 HYDRATE IV INFUSION ADD-ON: CPT | Performed by: EMERGENCY MEDICINE

## 2020-03-26 PROCEDURE — 96374 THER/PROPH/DIAG INJ IV PUSH: CPT | Performed by: EMERGENCY MEDICINE

## 2020-03-26 PROCEDURE — 80048 BASIC METABOLIC PNL TOTAL CA: CPT | Performed by: EMERGENCY MEDICINE

## 2020-03-26 RX ORDER — KETOROLAC TROMETHAMINE 30 MG/ML
30 INJECTION, SOLUTION INTRAMUSCULAR; INTRAVENOUS ONCE
Status: COMPLETED | OUTPATIENT
Start: 2020-03-26 | End: 2020-03-26

## 2020-03-26 RX ORDER — CLONIDINE HYDROCHLORIDE 0.1 MG/1
0.1 TABLET ORAL ONCE
Status: COMPLETED | OUTPATIENT
Start: 2020-03-26 | End: 2020-03-26

## 2020-03-26 RX ORDER — SODIUM CHLORIDE 9 MG/ML
1000 INJECTION, SOLUTION INTRAVENOUS CONTINUOUS
Status: DISCONTINUED | OUTPATIENT
Start: 2020-03-26 | End: 2020-03-26 | Stop reason: HOSPADM

## 2020-03-26 RX ADMIN — SODIUM CHLORIDE 1000 ML: 9 INJECTION, SOLUTION INTRAVENOUS at 14:10

## 2020-03-26 RX ADMIN — CLONIDINE HYDROCHLORIDE 0.1 MG: 0.1 TABLET ORAL at 13:57

## 2020-03-26 RX ADMIN — KETOROLAC TROMETHAMINE 30 MG: 30 INJECTION, SOLUTION INTRAMUSCULAR at 14:08

## 2020-03-26 ASSESSMENT — ENCOUNTER SYMPTOMS
SHORTNESS OF BREATH: 1
CHILLS: 1
COUGH: 1
HEADACHES: 1
PALPITATIONS: 0
FATIGUE: 1
NAUSEA: 0
WEAKNESS: 0
CHEST TIGHTNESS: 1
FEVER: 1
DIZZINESS: 0
VOMITING: 0

## 2020-03-26 NOTE — ED AVS SNAPSHOT
[facility] Emergency Department  [address] [place] [address]  Phone:  [phone]  Fax:  [phone]                                    Wally Flores   MRN: [id number]    Department:  [facility] Emergency Department   Date of Visit:  3/26/2020           After Visit Summary Signature Page    I have received my discharge instructions, and my questions have been answered. I have discussed any challenges I see with this plan with the nurse or doctor.    ..........................................................................................................................................  Patient/Patient Representative Signature      ..........................................................................................................................................  Patient Representative Print Name and Relationship to Patient    ..................................................               ................................................  Date                                   Time    ..........................................................................................................................................  Reviewed by Signature/Title    ...................................................              ..............................................  Date                                               Time          22EPIC Rev 08/18

## 2020-03-26 NOTE — DISCHARGE INSTRUCTIONS
Take Tylenol and ibuprofen per bottle instructions as needed for fever, headache, other aches and pains    Drink plenty of fluids and get plenty of rest    Be sure to cover your cough and sneeze, wash hands frequently, and stay at home so as not to infect others.  You may return to work or school if they are open if you have been without a fever for 72 hours, and your symptoms have improved    Call your primary provider to schedule a follow-up appointment.  They may need to conduct this over the telephone since there are limited in person appointments being done right now.  Your primary provider may need to adjust your blood pressure medication to better control your high blood pressure.    Return to the ER if you have difficulty breathing or any worsening symptoms

## 2020-03-26 NOTE — ED PROVIDER NOTES
History     Chief Complaint   Patient presents with     Flu Symptoms     HPI  Wally Flores is a 29 year old male who presents to the ER with fever, cough, chest tightness, and hypertension.  He states that he has not been feeling well for the last 4 days.  He has a subjective fever at home, chills, cough, and chest tightness.  He states that he felt very winded when he was walking up the stairs yesterday, which prompted him to check his blood pressure.  At that time it was 200 systolic.  Per chart review, see the patient called nurse triage line and was instructed to go to the ER, but he did not.  Patient did not say anything about calling nurse triage line, and states that he did not do anything different after obtaining that elevated blood pressure.  He normally takes medication for hypertension, but did not take any extra, he also does not admit to missing any doses.  He also has a cough, which is nonproductive.  He did not receive a flu shot in the fall.    Allergies:  Allergies   Allergen Reactions     Penicillins Rash and Hives     Acetaminophen Rash       Problem List:    Patient Active Problem List    Diagnosis Date Noted     Migraine with aura and without status migrainosus, not intractable 01/07/2020     Priority: Medium     Generalized anxiety disorder 07/11/2016     Priority: Medium     Essential hypertension, benign 06/16/2016     Priority: Medium     PTSD (post-traumatic stress disorder) 06/15/2015     Priority: Medium     Hx of suicide attempt 06/11/2015     Priority: Medium        Past Medical History:    Past Medical History:   Diagnosis Date     Herpes zoster without mention of complication 6th grade     Hypertension      MEDICAL HISTORY OF -      Ostium secundum type atrial septal defect      Other peripheral vascular disease(443.89)      Tuberculous abscess of spinal cord, 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     Unspecified otitis media      Varicella without mention of complication 1989        Past Surgical History:    Past Surgical History:   Procedure Laterality Date     ADENOIDECTOMY  94     HC SPINAL PUNCTURE, THERAPEUTIC DRAINAGE CEREBROSPINAL FLUID      Drain Staph aureus epidural abscess     PE TUBES  94     TONSILLECTOMY  97     TYMPANOPLASTY, RT/LT  97    Tympanoplasty RT/LT       Family History:    Family History   Problem Relation Age of Onset     Diabetes Mother      Hypertension Mother      Cancer Maternal Grandmother      Cancer - colorectal Maternal Grandfather      Cardiovascular Paternal Grandfather          at 32 from MI     No Known Problems Paternal Grandmother      Hypertension Brother      Coronary Artery Disease Father 50     Heart Disease Father      Hypertension Father      Diabetes Other         PGGM     Hypertension Brother      Hypertension Brother        Social History:  Marital Status:  Single [1]  Social History     Tobacco Use     Smoking status: Never Smoker     Smokeless tobacco: Never Used   Substance Use Topics     Alcohol use: Yes     Alcohol/week: 0.0 standard drinks     Comment: 2 times per week./quit 16     Drug use: No        Medications:    atenolol-chlorthalidone (TENORETIC) 50-25 MG tablet  citalopram 10 MG PO tablet  lisinopril (PRINIVIL/ZESTRIL) 10 MG tablet  SUMAtriptan (IMITREX) 50 MG tablet          Review of Systems   Constitutional: Positive for chills, fatigue and fever.   HENT: Positive for congestion.    Respiratory: Positive for cough, chest tightness and shortness of breath.    Cardiovascular: Positive for chest pain. Negative for palpitations and leg swelling.   Gastrointestinal: Negative for nausea and vomiting.   Neurological: Positive for headaches. Negative for dizziness and weakness.   All other systems reviewed and are negative.      Physical Exam   BP: (!) 183/116  Pulse: 108  Temp: 97.2  F (36.2  C)  Resp: 16  Weight: 99.8 kg (220 lb)  SpO2: 97 %      Physical Exam  Vitals signs and nursing note  reviewed.   Constitutional:       General: He is not in acute distress.     Appearance: He is ill-appearing.      Comments: Mildly ill-appearing   HENT:      Head: Normocephalic and atraumatic.      Right Ear: Tympanic membrane normal.      Left Ear: Tympanic membrane normal.      Nose: Nose normal.      Mouth/Throat:      Mouth: Mucous membranes are moist.      Pharynx: Oropharynx is clear.   Neck:      Musculoskeletal: Normal range of motion and neck supple. No neck rigidity.   Cardiovascular:      Rate and Rhythm: Normal rate and regular rhythm.   Pulmonary:      Effort: Pulmonary effort is normal. No respiratory distress.      Breath sounds: Normal breath sounds. No wheezing.   Abdominal:      General: Bowel sounds are normal.      Palpations: Abdomen is soft.   Skin:     General: Skin is warm and dry.      Capillary Refill: Capillary refill takes less than 2 seconds.      Findings: No rash.   Neurological:      Mental Status: He is alert.   Psychiatric:         Mood and Affect: Mood normal.         Behavior: Behavior normal.         ED Course        Procedures               EKG Interpretation:      Interpreted by Jossy Anders DO  Time reviewed: 1332  Symptoms at time of EKG: chest heaviness   Rhythm: normal sinus   Rate: normal  Axis: normal  Ectopy: none  Conduction: normal  ST Segments/ T Waves: No ST-T wave changes  Q Waves: none  Comparison to prior: No old EKG available    Clinical Impression: normal EKG          Critical Care time:  none               Results for orders placed or performed during the hospital encounter of 03/26/20 (from the past 24 hour(s))   Influenza A/B antigen   Result Value Ref Range    Influenza A/B Agn Specimen Nasopharyngeal     Influenza A Negative NEG^Negative    Influenza B Negative NEG^Negative   XR Chest Port 1 View    Narrative    CHEST PORTABLE ONE VIEW March 26, 2020 1:53 PM     HISTORY: Cough, chest tightness, hypertension.    COMPARISON: None.      Impression     IMPRESSION: No acute cardiopulmonary disease.    TOO COOLEY MD   CBC with platelets differential   Result Value Ref Range    WBC 3.6 (L) 4.0 - 11.0 10e9/L    RBC Count 4.62 4.4 - 5.9 10e12/L    Hemoglobin 15.1 13.3 - 17.7 g/dL    Hematocrit 44.7 40.0 - 53.0 %    MCV 97 78 - 100 fl    MCH 32.7 26.5 - 33.0 pg    MCHC 33.8 31.5 - 36.5 g/dL    RDW 13.0 10.0 - 15.0 %    Platelet Count 238 150 - 450 10e9/L    Diff Method Automated Method     % Neutrophils 56.7 %    % Lymphocytes 29.2 %    % Monocytes 10.7 %    % Eosinophils 1.7 %    % Basophils 1.7 %    % Immature Granulocytes 0.0 %    Nucleated RBCs 0 0 /100    Absolute Neutrophil 2.0 1.6 - 8.3 10e9/L    Absolute Lymphocytes 1.0 0.8 - 5.3 10e9/L    Absolute Monocytes 0.4 0.0 - 1.3 10e9/L    Absolute Basophils 0.1 0.0 - 0.2 10e9/L    Abs Immature Granulocytes 0.0 0 - 0.4 10e9/L    Absolute Nucleated RBC 0.0    Basic metabolic panel   Result Value Ref Range    Sodium 137 133 - 144 mmol/L    Potassium 3.5 3.4 - 5.3 mmol/L    Chloride 104 94 - 109 mmol/L    Carbon Dioxide 25 20 - 32 mmol/L    Anion Gap 8 3 - 14 mmol/L    Glucose 96 70 - 99 mg/dL    Urea Nitrogen 13 7 - 30 mg/dL    Creatinine 0.86 0.66 - 1.25 mg/dL    GFR Estimate >90 >60 mL/min/[1.73_m2]    GFR Estimate If Black >90 >60 mL/min/[1.73_m2]    Calcium 8.7 8.5 - 10.1 mg/dL   Troponin I   Result Value Ref Range    Troponin I ES 0.017 0.000 - 0.045 ug/L       Medications   0.9% sodium chloride BOLUS (has no administration in time range)     Followed by   sodium chloride 0.9% infusion (has no administration in time range)   ketorolac (TORADOL) injection 30 mg (has no administration in time range)   cloNIDine (CATAPRES) tablet 0.1 mg (has no administration in time range)       Assessments & Plan (with Medical Decision Making)  Wally is a 29-year-old male who presents to the ER with cough, chest pain, fever, and hypertension for the last 4 days.  States that symptoms have been persistent, and he is worried because  they are not getting any better.  See history and physical exam as above  Patient is mildly ill-appearing, but largely unremarkable physical exam.  EKG was obtained and shows normal sinus rhythm.  He was given a dose of clonidine for elevated blood pressure as well as fluid bolus.  Obtained labs and flu swab, as well as chest x-ray.  Chest x-ray was clear, no evidence of infiltrate or consolidation.  White blood cell count was low at 3.6, consistent with a viral etiology.  Influenza swab was negative.  Troponin was 0.017, within normal limits.  Remainder of his labs are unremarkable.  Patient states that he feels somewhat better after receiving IV fluids and IM Toradol.  Blood pressure improved to 150 systolic over 0.1 mg clonidine in the ER.  Informed the patient of supportive care at home, importance of self quarantining and monitoring symptoms at home, and to follow-up with his primary provider regarding the hypertension, may need to have medications changed, and they may be able to do this over the phone.  Given ED precautions to return, which included difficulty breathing or any worsening symptoms.  He understands and agrees.  Discharge from the ED in stable condition, no acute distress     I have reviewed the nursing notes.    I have reviewed the findings, diagnosis, plan and need for follow up with the patient.       New Prescriptions    No medications on file       Final diagnoses:   Accelerated hypertension   Viral URI with cough       3/26/2020   [facility] EMERGENCY DEPARTMENT     Jsosy Anders DO  03/26/20 [id number]

## 2020-03-26 NOTE — LETTER
March 26, 2020      To Whom It May Concern:      Wally Flores was seen in our Emergency Department today, 03/26/20.  He may return to work when he has been free of a fever for 72 hours and his symptoms have improved.    Sincerely,        Jossy Anders, DO

## 2020-06-17 ENCOUNTER — MYC REFILL (OUTPATIENT)
Dept: FAMILY MEDICINE | Facility: CLINIC | Age: 30
End: 2020-06-17

## 2020-06-17 DIAGNOSIS — F41.1 GENERALIZED ANXIETY DISORDER: ICD-10-CM

## 2020-06-17 DIAGNOSIS — I10 ESSENTIAL HYPERTENSION, BENIGN: ICD-10-CM

## 2020-06-17 DIAGNOSIS — G43.109 MIGRAINE WITH AURA AND WITHOUT STATUS MIGRAINOSUS, NOT INTRACTABLE: ICD-10-CM

## 2020-06-18 RX ORDER — CITALOPRAM HYDROBROMIDE 10 MG/1
TABLET ORAL
Qty: 60 TABLET | Refills: 0 | OUTPATIENT
Start: 2020-06-18

## 2020-06-18 RX ORDER — LISINOPRIL 20 MG/1
20 TABLET ORAL DAILY
Qty: 30 TABLET | Refills: 0 | Status: SHIPPED | OUTPATIENT
Start: 2020-06-18 | End: 2021-09-01

## 2020-06-18 RX ORDER — SUMATRIPTAN 50 MG/1
50 TABLET, FILM COATED ORAL
Qty: 12 TABLET | Refills: 0 | Status: SHIPPED | OUTPATIENT
Start: 2020-06-18 | End: 2021-09-01

## 2020-06-18 RX ORDER — ATENOLOL AND CHLORTHALIDONE TABLET 50; 25 MG/1; MG/1
1 TABLET ORAL DAILY
Qty: 30 TABLET | Refills: 0 | Status: SHIPPED | OUTPATIENT
Start: 2020-06-18 | End: 2021-09-01

## 2021-01-09 ENCOUNTER — HEALTH MAINTENANCE LETTER (OUTPATIENT)
Age: 31
End: 2021-01-09

## 2021-02-05 ENCOUNTER — HOSPITAL ENCOUNTER (EMERGENCY)
Facility: CLINIC | Age: 31
Discharge: HOME | End: 2021-02-05
Attending: PHYSICIAN ASSISTANT | Admitting: PHYSICIAN ASSISTANT

## 2021-02-05 VITALS
RESPIRATION RATE: 18 BRPM | TEMPERATURE: 97.9 F | SYSTOLIC BLOOD PRESSURE: 128 MMHG | HEART RATE: 94 BPM | OXYGEN SATURATION: 96 % | DIASTOLIC BLOOD PRESSURE: 92 MMHG

## 2021-02-05 DIAGNOSIS — F10.929 ALCOHOL INTOXICATION (H): ICD-10-CM

## 2021-02-05 LAB
ALBUMIN SERPL-MCNC: 4.6 G/DL (ref 3.4–5)
ALCOHOL BREATH TEST: 0.32 (ref 0–0.01)
ALP SERPL-CCNC: 94 U/L (ref 40–150)
ALT SERPL W P-5'-P-CCNC: 60 U/L (ref 0–70)
ANION GAP SERPL CALCULATED.3IONS-SCNC: 9 MMOL/L (ref 3–14)
AST SERPL W P-5'-P-CCNC: 71 U/L (ref 0–45)
BASOPHILS # BLD AUTO: 0.1 10E9/L (ref 0–0.2)
BASOPHILS NFR BLD AUTO: 1.1 %
BILIRUB SERPL-MCNC: 0.6 MG/DL (ref 0.2–1.3)
BUN SERPL-MCNC: 8 MG/DL (ref 7–30)
CALCIUM SERPL-MCNC: 8.6 MG/DL (ref 8.5–10.1)
CHLORIDE SERPL-SCNC: 106 MMOL/L (ref 94–109)
CO2 SERPL-SCNC: 28 MMOL/L (ref 20–32)
CREAT SERPL-MCNC: 0.88 MG/DL (ref 0.66–1.25)
DIFFERENTIAL METHOD BLD: NORMAL
EOSINOPHIL NFR BLD AUTO: 0.2 %
ERYTHROCYTE [DISTWIDTH] IN BLOOD BY AUTOMATED COUNT: 13.6 % (ref 10–15)
ETHANOL SERPL-MCNC: 0.58 G/DL
GFR SERPL CREATININE-BSD FRML MDRD: >90 ML/MIN/{1.73_M2}
GLUCOSE SERPL-MCNC: 108 MG/DL (ref 70–99)
HCT VFR BLD AUTO: 49.1 % (ref 40–53)
HGB BLD-MCNC: 17.2 G/DL (ref 13.3–17.7)
IMM GRANULOCYTES # BLD: 0 10E9/L (ref 0–0.4)
IMM GRANULOCYTES NFR BLD: 0.4 %
LABORATORY COMMENT REPORT: NORMAL
LIPASE SERPL-CCNC: 358 U/L (ref 73–393)
LYMPHOCYTES # BLD AUTO: 1.6 10E9/L (ref 0.8–5.3)
LYMPHOCYTES NFR BLD AUTO: 29.3 %
MCH RBC QN AUTO: 32.9 PG (ref 26.5–33)
MCHC RBC AUTO-ENTMCNC: 35 G/DL (ref 31.5–36.5)
MCV RBC AUTO: 94 FL (ref 78–100)
MONOCYTES # BLD AUTO: 0.2 10E9/L (ref 0–1.3)
MONOCYTES NFR BLD AUTO: 4.2 %
NEUTROPHILS # BLD AUTO: 3.5 10E9/L (ref 1.6–8.3)
NEUTROPHILS NFR BLD AUTO: 64.8 %
NRBC # BLD AUTO: 0 10*3/UL
NRBC BLD AUTO-RTO: 0 /100
PLATELET # BLD AUTO: 252 10E9/L (ref 150–450)
POTASSIUM SERPL-SCNC: 3.8 MMOL/L (ref 3.4–5.3)
PROT SERPL-MCNC: 8.5 G/DL (ref 6.8–8.8)
RBC # BLD AUTO: 5.23 10E12/L (ref 4.4–5.9)
SARS-COV-2 RNA RESP QL NAA+PROBE: NEGATIVE
SODIUM SERPL-SCNC: 143 MMOL/L (ref 133–144)
SPECIMEN SOURCE: NORMAL
WBC # BLD AUTO: 5.4 10E9/L (ref 4–11)

## 2021-02-05 PROCEDURE — 83690 ASSAY OF LIPASE: CPT | Performed by: PHYSICIAN ASSISTANT

## 2021-02-05 PROCEDURE — 250N000011 HC RX IP 250 OP 636: Performed by: PHYSICIAN ASSISTANT

## 2021-02-05 PROCEDURE — 82075 ASSAY OF BREATH ETHANOL: CPT | Performed by: PHYSICIAN ASSISTANT

## 2021-02-05 PROCEDURE — 99284 EMERGENCY DEPT VISIT MOD MDM: CPT | Mod: 25 | Performed by: PHYSICIAN ASSISTANT

## 2021-02-05 PROCEDURE — 99284 EMERGENCY DEPT VISIT MOD MDM: CPT | Performed by: PHYSICIAN ASSISTANT

## 2021-02-05 PROCEDURE — C9803 HOPD COVID-19 SPEC COLLECT: HCPCS | Performed by: PHYSICIAN ASSISTANT

## 2021-02-05 PROCEDURE — 96365 THER/PROPH/DIAG IV INF INIT: CPT | Performed by: PHYSICIAN ASSISTANT

## 2021-02-05 PROCEDURE — 80053 COMPREHEN METABOLIC PANEL: CPT | Performed by: PHYSICIAN ASSISTANT

## 2021-02-05 PROCEDURE — 85025 COMPLETE CBC W/AUTO DIFF WBC: CPT | Performed by: PHYSICIAN ASSISTANT

## 2021-02-05 PROCEDURE — 258N000003 HC RX IP 258 OP 636: Performed by: PHYSICIAN ASSISTANT

## 2021-02-05 PROCEDURE — 82077 ASSAY SPEC XCP UR&BREATH IA: CPT | Performed by: PHYSICIAN ASSISTANT

## 2021-02-05 PROCEDURE — 87635 SARS-COV-2 COVID-19 AMP PRB: CPT | Performed by: PHYSICIAN ASSISTANT

## 2021-02-05 PROCEDURE — 250N000009 HC RX 250: Performed by: PHYSICIAN ASSISTANT

## 2021-02-05 RX ADMIN — FOLIC ACID: 5 INJECTION, SOLUTION INTRAMUSCULAR; INTRAVENOUS; SUBCUTANEOUS at 15:35
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
## 2021-02-06 NOTE — ED NOTES
Pt took his IV out while in the bathroom. Pt was seen to be walking out of ED and out to the parking lot. Writer and Contreras RN escorted pt back to ED room d/t KAEL of 0.58. Primary RN notified.

## 2021-02-06 NOTE — DISCHARGE INSTRUCTIONS
It was a pleasure working with you today!  I hope your condition improves rapidly!     Please work on your drinking and try and remain sober.  Call 911 if your symptoms worsen.

## 2021-02-06 NOTE — ED NOTES
Wally went to bathroom, took IV out, got dressed and proceeded to leave building,   This nurse and one other nurse, followed PT outside, and talked him back in to the building, pt was scared but cooperative.

## 2021-08-10 PROBLEM — R45.851 SUICIDAL IDEATION: Status: ACTIVE | Noted: 2021-08-10

## 2021-08-10 PROBLEM — F33.2 SEVERE EPISODE OF RECURRENT MAJOR DEPRESSIVE DISORDER, WITHOUT PSYCHOTIC FEATURES (HCC): Status: ACTIVE | Noted: 2021-08-10

## 2021-08-10 PROBLEM — F10.20 ALCOHOL DEPENDENCE, EPISODIC (HCC): Status: ACTIVE | Noted: 2021-08-10

## 2021-08-10 NOTE — ED QUICK NOTES
Pt was holding bifocal glasses and dropped them onto the floor. Left lens of bifocal glasses broke. Attempted to fix lens with medical tape.

## 2021-08-10 NOTE — ED NOTES
Updated pt on POC. Pt calm and cooperative, does not appear to be actively withdrawing. CIWA: 3 @ 1:26 PM    Pt has not been vaccinated.  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

## 2021-08-10 NOTE — ED QUICK NOTES
Pt resting in bed with eyes closed, respirating well on room air. No acute distress noted. Sitter present. Will continue to monitor.
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
## 2021-08-10 NOTE — ED QUICK NOTES
Spoke with Brooklyn Lone in length. He does admit to some degree of depression and anxiety but denies active suicidal ideation or previous attempts. States, \"I just feel lost in my thoughts. \" Reports hx of psychiatric hospitalization and outpatient therapy \"a

## 2021-08-10 NOTE — ED NOTES
Patient's brother called Antonio Asif. Karen Turner reports pt has been going through episodes of heavy drinking and making suicidal statements to family members.  Brother said the last time this has happened was a month ago and has probably had 30 episodes over the pas

## 2021-08-10 NOTE — PROGRESS NOTES
08/10/21 0919   Suicide Risk   Source of information for CSSR Collateral   In what setting is the screener performed? telephone   1. Have you wished you were dead or wished you could go to sleep and not wake up? (past 30 days) 1   2.  Have you actually h

## 2021-08-10 NOTE — CONSULTS
[facility]    Report of Consultation    James Kellogg Patient Status:  Emergency    1990 MRN [id number]   Location [address] Attending Ta Durham Day # 0 PCP No primary care prov with his emotion and recent binging episode of intoxication. The patient reported that he drink to calm his emotion. Patient reported that he is not sure that he was suicidal but he is \"at the end of his rope\".     The patient admitted that he has a t with pt withdrawals and extreme shakiness. Pt's aunt was concerned pt was consuming much more alcohol than he was letting on.  Patient told his aunt, at the time, he was changing his diet.      Patient's mother is unaware whether pt has had any previous SI  (H) 08/10/2021    K 3.9 08/10/2021     08/10/2021    CO2 26.0 08/10/2021     (H) 08/10/2021    CA 9.0 08/10/2021    ETOH 114 (H) 08/10/2021         Imaging:  No results found.     Vital Signs:     Blood pressure 152/85, pulse 105, tem himself on his safety and feel to contract for safety. Patient agreeable for inpatient admission. Discussed risk and benefit, acknowledging the current symptom and severity. At this point, I would recommend the following approach:     1.   Focus on saf

## 2021-08-10 NOTE — PROGRESS NOTES
08/10/21 1041   COVID Exposure Risk Screening   Have you been practicing social distancing? Yes   Have you been wearing a mask when in the community?  Yes   Are the people you live with following social distancing and wearing a mask?   (lives with his fa

## 2021-08-10 NOTE — ED PROVIDER NOTES
Patient Seen in: [facility] AND [facility] Emergency Department      History   Patient presents with:  Eval-D  Eval-P    Stated Complaint: eval-d/eval-p    HPI/Subjective:   HPI  Patient is a 11-year-old male history of hypertension, diabetes presenting from con are equal, round, and reactive to light. Cardiovascular:      Rate and Rhythm: Regular rhythm. Tachycardia present. Pulmonary:      Effort: Pulmonary effort is normal. No respiratory distress. Breath sounds: Normal breath sounds.    Abdominal: qualitative detection and differentiation of SARS-CoV-2, influenza A, influenza B, and respiratory syncytial virus (RSV) viral RNA in nasopharyngeal swab from individuals suspected of respiratory viral infection consistent with COVID-19 by their healthcare critical care time in obtaining history, performing a physical exam, bedside monitoring of interventions, collecting and interpreting tests and discussion with consultants but not including time spent performing procedures.                            Dispos

## 2021-08-10 NOTE — ED QUICK NOTES
Pt resting in bed with eyes closed. No acute distress noted. Respirating well on room air. Sitter present. Will continue to monitor.

## 2021-08-10 NOTE — ED NOTES
Writer spoke with mother: Leeanna Lore [phone]. Patient's mother told writer she was very concerned for patient's safety and wellbeing. Patient was at work last Thursday and suddenly left with a migraine.  Patient did not show up to work the next [employer]

## 2021-08-10 NOTE — ED NOTES
Attempted to assess patient, patient stated who are you and why was I coming in his room.   After introducing myself to patient patient stated that he did not want an assessment at the moment, this writer observed patient slurring words, responding to quest

## 2021-08-10 NOTE — BH LEVEL OF CARE ASSESSMENT
Crisis Evaluation Assessment    Aguserick Andrews YOB: 1990   Age 27year old MRN [id number]   Location [address] Attending Kapil Castillo      Patient's legal sex: male  Patient identifies as: male  Megha Franz suddenly left with a migraine. Patient did not show up to work the next day and employer did a wellbeing check as well as contacted mother.  Patient was not at his home and was not reachable until he called his mother yesterday (Monday 8/9) and told her he actually had any thoughts of killing yourself? (past 30 days) 1   3. Have you been thinking about how you might kill yourself? (past 30 days)    (emelia)   4. Have you had these thoughts and had some intention of acting on them? (past 30 days) 3   5a.  Have yo texted mother yesterday that he was done with life and just wanted to end it all. Patient has been missing for 2 days and did not show up for work. Employer alerted mother to pt missing after a wellbeing check made to his home.   Is your experience of mariog Behavior  Gait/Movement: Slow  Posture: Relaxed  Rate of Movement: Normal  Mood and Affect  Mood or Feelings: Calm;Stressed;Depressed  Appropriateness of Affect: Appropriate to situation;Congruent to mood  Range of Affect: Normal  Stability of Affect: Stab Secondary Psychiatric Diagnoses  Alcohol Use Disorder.     Pervasive Diagnoses  n/a  Pertinent Non-Psychiatric Diagnoses  n/a         Tala Fenton LCSW

## 2021-08-10 NOTE — ED INITIAL ASSESSMENT (HPI)
Pt to ed via ems for etoh and possible psych eval. Per ems pt consumed unknown amount of vodka and made statements that he was depressed and wanted to harm himself and thinks his mother wants to hurt him.  Pt is tearful during triage, cooperative, sts, \"Sh

## 2021-08-11 PROBLEM — F32.2 MDD (MAJOR DEPRESSIVE DISORDER), SINGLE EPISODE, SEVERE (HCC): Status: ACTIVE | Noted: 2021-08-11

## 2021-08-11 NOTE — ED NOTES
Danny Sandhoff from [facility] confirms they will not address pt/packet until he has been sober for 72 hrs.

## 2021-08-11 NOTE — ED NOTES
Patient signed out to me from previous medical team.  Patient is a 26-year-old male presenting with suicidal ideation. Pending inpatient psychiatric transfer. 5:56 AM  Stable throughout my shift.   Signed out to oncoming medical team.

## 2021-08-11 NOTE — ED PROVIDER NOTES
Patient initially seen by my colleague after presenting with SI.   I evaluated the patient and currently patient is calm. The psych border order set has  been ordered to address on going needs. The tentative disposition at this point is transfer.

## 2021-08-11 NOTE — ED NOTES
Patient states he has not vaccinated, but has attempted to get the vaccine a few times. It has been difficult due to work travel. Patient is interested if available. Patient states over the last 2 weeks, he has only worked on 2 different sites.   Last

## 2021-08-11 NOTE — ED NOTES
Reviewed social distance screener and new information with Albertina MEANS sup, if pt comes to [facility], he does not need any covid restrictions and can have a roommate at this time.

## 2021-08-11 NOTE — ED QUICK NOTES
ROUNDING COMPLETED  PAIN: PATIENT DENIES PAIN AT THIS TIME  WAITING: PSYCH PLACEMENT  ELIMINATION: BRP OFFERED  NEEDS: NO ADDITIONAL NEEDS AT THIS TIME    BED LOCKED AND IN LOWEST POSITION. SITTER IN LINE OF SIGHT. WILL CONTINUE TO MONITOR.  WILL UPDATE P

## 2021-08-11 NOTE — PROGRESS NOTES
Patient is a 27year old  single male with history of depression and alcohol dependence who presented to the hospital after patient was missing and found by police.   Patient was with alcohol blood concentration of 469 reporting suicidal     Cons shutting his mind. Patient denying any auditory or visual hallucination. Patient reporting passive suicidal ideation and failing to contract for safety requesting to be in a hospital to prevent him from harming self.     The patient has been on Librium 10 appropriate vital sign. Otherwise reporting headache. The patient made an effort to be attentive and cooperative. Gait was not examined. The patient reporting that he has been feeling severely depressed, hopeless and helpless.   Patient denied active rolle daily.  6.  Increase Abilify to 5 mg nightly. 7.  Start Seroquel 100 mg nightly.   8.  Coordinate treatment plan with the team.    Orders This Visit:  Orders Placed This Encounter      CBC With Differential With Platelet      Basic Metabolic Panel (8)

## 2021-09-01 ENCOUNTER — VIRTUAL VISIT (OUTPATIENT)
Dept: PSYCHOLOGY | Facility: CLINIC | Age: 31
End: 2021-09-01

## 2021-09-01 ENCOUNTER — OFFICE VISIT (OUTPATIENT)
Dept: FAMILY MEDICINE | Facility: CLINIC | Age: 31
End: 2021-09-01

## 2021-09-01 VITALS
TEMPERATURE: 97.9 F | WEIGHT: 221.8 LBS | HEART RATE: 124 BPM | HEIGHT: 73 IN | RESPIRATION RATE: 18 BRPM | SYSTOLIC BLOOD PRESSURE: 162 MMHG | DIASTOLIC BLOOD PRESSURE: 104 MMHG | BODY MASS INDEX: 29.4 KG/M2 | OXYGEN SATURATION: 98 %

## 2021-09-01 DIAGNOSIS — G43.109 MIGRAINE WITH AURA AND WITHOUT STATUS MIGRAINOSUS, NOT INTRACTABLE: ICD-10-CM

## 2021-09-01 DIAGNOSIS — I10 ESSENTIAL HYPERTENSION, BENIGN: ICD-10-CM

## 2021-09-01 DIAGNOSIS — Z09 HOSPITAL DISCHARGE FOLLOW-UP: Primary | ICD-10-CM

## 2021-09-01 DIAGNOSIS — F32.2 MDD (MAJOR DEPRESSIVE DISORDER), SINGLE EPISODE, SEVERE (H): ICD-10-CM

## 2021-09-01 DIAGNOSIS — F41.1 GENERALIZED ANXIETY DISORDER: ICD-10-CM

## 2021-09-01 DIAGNOSIS — R45.851 SUICIDAL IDEATION: ICD-10-CM

## 2021-09-01 DIAGNOSIS — F10.20 ALCOHOL USE DISORDER, MODERATE, DEPENDENCE (H): ICD-10-CM

## 2021-09-01 DIAGNOSIS — F43.10 PTSD (POST-TRAUMATIC STRESS DISORDER): Primary | ICD-10-CM

## 2021-09-01 DIAGNOSIS — F10.20 ALCOHOL DEPENDENCE, EPISODIC (H): ICD-10-CM

## 2021-09-01 PROBLEM — F33.2 SEVERE EPISODE OF RECURRENT MAJOR DEPRESSIVE DISORDER, WITHOUT PSYCHOTIC FEATURES (H): Status: ACTIVE | Noted: 2021-08-10

## 2021-09-01 PROBLEM — F33.2 SEVERE EPISODE OF RECURRENT MAJOR DEPRESSIVE DISORDER, WITHOUT PSYCHOTIC FEATURES (H): Status: RESOLVED | Noted: 2021-08-10 | Resolved: 2021-09-01

## 2021-09-01 PROCEDURE — 99214 OFFICE O/P EST MOD 30 MIN: CPT | Performed by: FAMILY MEDICINE

## 2021-09-01 PROCEDURE — 90834 PSYTX W PT 45 MINUTES: CPT | Mod: 95 | Performed by: COUNSELOR

## 2021-09-01 RX ORDER — QUETIAPINE FUMARATE 100 MG/1
100 TABLET, FILM COATED ORAL DAILY
Qty: 90 TABLET | Refills: 0 | Status: SHIPPED | OUTPATIENT
Start: 2021-09-01 | End: 2022-05-06

## 2021-09-01 RX ORDER — ARIPIPRAZOLE 5 MG/1
5 TABLET ORAL DAILY
Qty: 90 TABLET | Refills: 0 | Status: SHIPPED | OUTPATIENT
Start: 2021-09-01 | End: 2022-05-06

## 2021-09-01 RX ORDER — ATENOLOL AND CHLORTHALIDONE TABLET 50; 25 MG/1; MG/1
1 TABLET ORAL DAILY
Qty: 90 TABLET | Refills: 0 | Status: SHIPPED | OUTPATIENT
Start: 2021-09-01 | End: 2022-04-04

## 2021-09-01 RX ORDER — ATENOLOL 25 MG/1
50 TABLET ORAL
COMMUNITY
Start: 2021-08-13 | End: 2021-09-01

## 2021-09-01 RX ORDER — FLUOXETINE 20 MG/1
40 TABLET, FILM COATED ORAL DAILY
Qty: 180 TABLET | Refills: 0 | Status: SHIPPED | OUTPATIENT
Start: 2021-09-01 | End: 2022-05-06

## 2021-09-01 RX ORDER — FLUOXETINE 20 MG/1
20 TABLET, FILM COATED ORAL DAILY
COMMUNITY
End: 2021-09-01

## 2021-09-01 RX ORDER — SUMATRIPTAN 50 MG/1
50 TABLET, FILM COATED ORAL
Qty: 12 TABLET | Refills: 4 | Status: SHIPPED | OUTPATIENT
Start: 2021-09-01 | End: 2023-01-18

## 2021-09-01 RX ORDER — ARIPIPRAZOLE 5 MG/1
5 TABLET ORAL DAILY
COMMUNITY
End: 2021-09-01

## 2021-09-01 RX ORDER — QUETIAPINE FUMARATE 100 MG/1
100 TABLET, FILM COATED ORAL DAILY
COMMUNITY
End: 2021-09-01

## 2021-09-01 ASSESSMENT — COLUMBIA-SUICIDE SEVERITY RATING SCALE - C-SSRS
3. HAVE YOU BEEN THINKING ABOUT HOW YOU MIGHT KILL YOURSELF?: NO
4. HAVE YOU HAD THESE THOUGHTS AND HAD SOME INTENTION OF ACTING ON THEM?: NO
5. HAVE YOU STARTED TO WORK OUT OR WORKED OUT THE DETAILS OF HOW TO KILL YOURSELF? DO YOU INTEND TO CARRY OUT THIS PLAN?: NO
5. HAVE YOU STARTED TO WORK OUT OR WORKED OUT THE DETAILS OF HOW TO KILL YOURSELF? DO YOU INTEND TO CARRY OUT THIS PLAN?: NO
1. IN THE PAST MONTH, HAVE YOU WISHED YOU WERE DEAD OR WISHED YOU COULD GO TO SLEEP AND NOT WAKE UP?: NO
4. HAVE YOU HAD THESE THOUGHTS AND HAD SOME INTENTION OF ACTING ON THEM?: NO
1. IN THE PAST MONTH, HAVE YOU WISHED YOU WERE DEAD OR WISHED YOU COULD GO TO SLEEP AND NOT WAKE UP?: NO
2. HAVE YOU ACTUALLY HAD ANY THOUGHTS OF KILLING YOURSELF LIFETIME?: NO
2. HAVE YOU ACTUALLY HAD ANY THOUGHTS OF KILLING YOURSELF?: NO

## 2021-09-01 ASSESSMENT — PATIENT HEALTH QUESTIONNAIRE - PHQ9
10. IF YOU CHECKED OFF ANY PROBLEMS, HOW DIFFICULT HAVE THESE PROBLEMS MADE IT FOR YOU TO DO YOUR WORK, TAKE CARE OF THINGS AT HOME, OR GET ALONG WITH OTHER PEOPLE: NOT DIFFICULT AT ALL
SUM OF ALL RESPONSES TO PHQ QUESTIONS 1-9: 2
5. POOR APPETITE OR OVEREATING: NOT AT ALL

## 2021-09-01 ASSESSMENT — ANXIETY QUESTIONNAIRES
GAD7 TOTAL SCORE: 3
7. FEELING AFRAID AS IF SOMETHING AWFUL MIGHT HAPPEN: NOT AT ALL
7. FEELING AFRAID AS IF SOMETHING AWFUL MIGHT HAPPEN: NOT AT ALL
GAD7 TOTAL SCORE: 2
3. WORRYING TOO MUCH ABOUT DIFFERENT THINGS: SEVERAL DAYS
8. IF YOU CHECKED OFF ANY PROBLEMS, HOW DIFFICULT HAVE THESE MADE IT FOR YOU TO DO YOUR WORK, TAKE CARE OF THINGS AT HOME, OR GET ALONG WITH OTHER PEOPLE?: NOT DIFFICULT AT ALL
IF YOU CHECKED OFF ANY PROBLEMS ON THIS QUESTIONNAIRE, HOW DIFFICULT HAVE THESE PROBLEMS MADE IT FOR YOU TO DO YOUR WORK, TAKE CARE OF THINGS AT HOME, OR GET ALONG WITH OTHER PEOPLE: NOT DIFFICULT AT ALL
6. BECOMING EASILY ANNOYED OR IRRITABLE: NOT AT ALL
3. WORRYING TOO MUCH ABOUT DIFFERENT THINGS: SEVERAL DAYS
4. TROUBLE RELAXING: SEVERAL DAYS
1. FEELING NERVOUS, ANXIOUS, OR ON EDGE: SEVERAL DAYS
1. FEELING NERVOUS, ANXIOUS, OR ON EDGE: SEVERAL DAYS
5. BEING SO RESTLESS THAT IT IS HARD TO SIT STILL: NOT AT ALL
2. NOT BEING ABLE TO STOP OR CONTROL WORRYING: NOT AT ALL
5. BEING SO RESTLESS THAT IT IS HARD TO SIT STILL: NOT AT ALL
GAD7 TOTAL SCORE: 3
7. FEELING AFRAID AS IF SOMETHING AWFUL MIGHT HAPPEN: NOT AT ALL
6. BECOMING EASILY ANNOYED OR IRRITABLE: NOT AT ALL
GAD7 TOTAL SCORE: 3
2. NOT BEING ABLE TO STOP OR CONTROL WORRYING: NOT AT ALL

## 2021-09-01 ASSESSMENT — PAIN SCALES - GENERAL: PAINLEVEL: NO PAIN (0)

## 2021-09-01 ASSESSMENT — MIFFLIN-ST. JEOR: SCORE: 2009.96
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
## 2021-09-01 NOTE — PROGRESS NOTES
Progress Note - Initial Visit    Client Name:  Wally Flores Date: 2021           Service Type: Individual     Visit Start Time: 10:00am  Visit End Time: 10:50am    Visit #: 1    Attendees: Client attended alone    Service Modality:  Video Visit:      Provider verified identity through the following two step process.  Patient provided:  Patient  and Patient address    Telemedicine Visit: The patient's condition can be safely assessed and treated via synchronous audio and visual telemedicine encounter.      Reason for Telemedicine Visit: Services only offered telehealth    Originating Site (Patient Location): Patient's home    Distant Site (Provider Location): Provider Remote Setting- Home Office    Consent:  The patient/guardian has verbally consented to: the potential risks and benefits of telemedicine (video visit) versus in person care; bill my insurance or make self-payment for services provided; and responsibility for payment of non-covered services.     Patient would like the video invitation sent by:  My Chart    Mode of Communication:  Video Conference via [facility]    As the provider I attest to compliance with applicable laws and regulations related to telemedicine.       DATA:   Interactive Complexity: No   Crisis: No     Presenting Concerns/  Current Stressors:   Alcohol use, PTSD      ASSESSMENT:  Mental Status Assessment:  Appearance:   Appropriate   Eye Contact:   Good   Psychomotor Behavior: Normal   Attitude:   Cooperative  Guarded   Orientation:   All  Speech   Rate / Production: Normal/ Responsive   Volume:  Normal   Mood:    Anxious   Affect:    Appropriate   Thought Content:  Clear   Thought Form:  Coherent   Insight:    Poor       Safety Issues and Plan for Safety and Risk Management:     [place] Suicide Severity Rating Scale (Lifetime/Recent)  [place] Suicide Severity Rating (Lifetime/Recent) 2021   1. Wish to be Dead (Lifetime) No   1. Wish to be Dead (Recent)  No   2. Non-Specific Active Suicidal Thoughts (Lifetime) No   2. Non-Specific Active Suicidal Thoughts (Recent) No   3. Active Suicidal Ideation with any Methods (Not Plan) Without Intent to Act (Lifetime) No   3. Active Suicidal Ideation with any Methods (Not Plan) Without Intent to Act (Recent) No   4. Active Suicidal Ideation with Some Intent to Act, Without Specific Plan (Lifetime) No   4. Active Suicidal Ideation with Some Intent to Act, Without Specific Plan (Recent) No   5. Active Suicidal Ideation with Specific Plan and Intent (Lifetime) No   5. Active Suicidal Ideation with Specific Plan and Intent (Recent) No     Patient denies current fears or concerns for personal safety.  Patient denies current or recent suicidal ideation or behaviors.  Patient denies current or recent homicidal ideation or behaviors.  Patient denies current or recent self injurious behavior or ideation.  Patient denies other safety concerns.  Recommended that patient call 911 or go to the local ED should there be a change in any of these risk factors.  Patient reports there are no firearms in the house.    Chart states client suicidal but client vehemently denies this. Denies ever being suicidal. Denies any thoughts or actions of any kind to this provider. States record was miscommunication.     Diagnostic Criteria:  A. The person has been exposed to a traumatic event in which both of the following were present:     (1) the person experienced, witnessed, or was confronted with an event or events that involved actual or threatened death or serious injury, or a threat to the physical integrity of self or others     (2) the person's response involved intense fear, helplessness, or horror. Note: In children, this may be expressed instead by disorganized or agitated behavior  B. The traumatic event is persistently reexperienced in one (or more) of the following ways:     - Recurrent and intrusive distressing recollections of the event,  including images, thoughts, or perceptions. Note: In young children, repetitive play may occur in which themes or aspects of the trauma are expressed.      - Recurrent distressing dreams of the event. Note: In children, there may be frightening dreams without recognizable content.      - Acting or feeling as if the traumatic event were recurring (includes a sense of reliving the experience, illusions, hallucinations, and dissociative flashback episodes, including those that occur on awakening or when intoxicated). Note: In young children, trauma-specific reenactment may occur.      - Intense psychological distress at exposure to internal or external cues that symbolize or resemble an aspect of the traumatic event.      - Physiological reactivity on exposure to internal or external cues that symbolize or resemble an aspect of the traumatic event.   C. Persistent avoidance of stimuli associated with the trauma and numbing of general responsiveness (not present before the trauma), as indicated by three (or more) of the following:     - Efforts to avoid thoughts, feelings, or conversations associated with the trauma.      - Efforts to avoid activities, places, or people that arouse recollections of the trauma.      - Inability to recall an important aspect of the trauma.      - Markedly diminished interest or participation in significant activities.      - Feeling of detachment or estrangement from others.      - Restricted range of affect (e.g., unable to have loving feelings).   D. Persistent symptoms of increased arousal (not present before the trauma), as indicated by two (or more) of the following:  E. Duration of the disturbance is more than 1 month.  F. The disturbance causes clinically significant distress or impairment in social, occupational, or other important areas of functioning.      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 Provider will continue Diagnostic Assessment.  Patient was given the following to do until next session:  F/u on meds with PCP.    2. Provider recommended the following referrals: will provide at completion of DA.      .       Davida Lee [facility]  September 1, 2021      Answers for HPI/ROS submitted by the patient on 9/1/2021  If you checked off any problems, how difficult have these problems made it for you to do your work, take care of things at home, or get along with other people?: Not difficult at all  PHQ9 TOTAL SCORE: 2  JAYDEN 7 TOTAL SCORE: 3

## 2021-09-02 ASSESSMENT — PATIENT HEALTH QUESTIONNAIRE - PHQ9: SUM OF ALL RESPONSES TO PHQ QUESTIONS 1-9: 2

## 2021-09-02 ASSESSMENT — ANXIETY QUESTIONNAIRES
GAD7 TOTAL SCORE: 2
GAD7 TOTAL SCORE: 3

## 2021-10-23 ENCOUNTER — HEALTH MAINTENANCE LETTER (OUTPATIENT)
Age: 31
End: 2021-10-23

## 2022-02-12 ENCOUNTER — HEALTH MAINTENANCE LETTER (OUTPATIENT)
Age: 32
End: 2022-02-12

## 2022-04-06 ENCOUNTER — VIRTUAL VISIT (OUTPATIENT)
Dept: FAMILY MEDICINE | Facility: CLINIC | Age: 32
End: 2022-04-06
Payer: COMMERCIAL

## 2022-04-06 DIAGNOSIS — Z53.9 ERRONEOUS ENCOUNTER--DISREGARD: Primary | ICD-10-CM

## 2022-04-06 NOTE — PROGRESS NOTES
"Froilan is a 31 year old who is being evaluated via a billable telephone visit.      What phone number would you like to be contacted at? ***  How would you like to obtain your AVS? {AVS Preference:940007}    {PROVIDER CHARTING PREFERENCE:611122}    Subjective   Froilan is a 31 year old who presents for the following health issues {ACCOMPANIED BY STATEMENT (Optional):940303}    HPI     Called cell phone # and left two messages.  Will try to call again later.    {SUPERLIST (Optional):218661}  {additonal problems for provider to add (Optional):115585}    Review of Systems   {ROS COMP (Optional):377371}      Objective           Vitals:  No vitals were obtained today due to virtual visit.    Physical Exam   {GENERAL APPEARANCE:50::\"healthy\",\"alert\",\"no distress\"}  PSYCH: Alert and oriented times 3; coherent speech, normal   rate and volume, able to articulate logical thoughts, able   to abstract reason, no tangential thoughts, no hallucinations   or delusions  His affect is { :2724993::\"normal\"}  RESP: No cough, no audible wheezing, able to talk in full sentences  Remainder of exam unable to be completed due to telephone visits    {Diagnostic Test Results (Optional):040238}    {AMBULATORY ATTESTATION (Optional):879754}        Phone call duration: *** minutes  "

## 2022-04-06 NOTE — PROGRESS NOTES
Multiple attempts (called 4 times, left 3 messages). Called both numbers in chart.  Home phone number with busy signals. Will cancel appointment.

## 2022-05-04 ASSESSMENT — ANXIETY QUESTIONNAIRES
7. FEELING AFRAID AS IF SOMETHING AWFUL MIGHT HAPPEN: NOT AT ALL
1. FEELING NERVOUS, ANXIOUS, OR ON EDGE: SEVERAL DAYS
5. BEING SO RESTLESS THAT IT IS HARD TO SIT STILL: NOT AT ALL
2. NOT BEING ABLE TO STOP OR CONTROL WORRYING: SEVERAL DAYS
GAD7 TOTAL SCORE: 5
4. TROUBLE RELAXING: SEVERAL DAYS
6. BECOMING EASILY ANNOYED OR IRRITABLE: SEVERAL DAYS
GAD7 TOTAL SCORE: 5
GAD7 TOTAL SCORE: 5
7. FEELING AFRAID AS IF SOMETHING AWFUL MIGHT HAPPEN: NOT AT ALL
3. WORRYING TOO MUCH ABOUT DIFFERENT THINGS: SEVERAL DAYS
8. IF YOU CHECKED OFF ANY PROBLEMS, HOW DIFFICULT HAVE THESE MADE IT FOR YOU TO DO YOUR WORK, TAKE CARE OF THINGS AT HOME, OR GET ALONG WITH OTHER PEOPLE?: SOMEWHAT DIFFICULT

## 2022-05-05 ASSESSMENT — ANXIETY QUESTIONNAIRES: GAD7 TOTAL SCORE: 5

## 2022-05-06 ENCOUNTER — VIRTUAL VISIT (OUTPATIENT)
Dept: FAMILY MEDICINE | Facility: CLINIC | Age: 32
End: 2022-05-06
Payer: COMMERCIAL

## 2022-05-06 DIAGNOSIS — F43.10 PTSD (POST-TRAUMATIC STRESS DISORDER): ICD-10-CM

## 2022-05-06 DIAGNOSIS — F32.2 MDD (MAJOR DEPRESSIVE DISORDER), SINGLE EPISODE, SEVERE (H): ICD-10-CM

## 2022-05-06 DIAGNOSIS — I10 ESSENTIAL HYPERTENSION, BENIGN: ICD-10-CM

## 2022-05-06 DIAGNOSIS — F41.1 GENERALIZED ANXIETY DISORDER: Primary | ICD-10-CM

## 2022-05-06 PROBLEM — R45.851 SUICIDAL IDEATION: Status: RESOLVED | Noted: 2021-08-10 | Resolved: 2022-05-06

## 2022-05-06 PROCEDURE — 99214 OFFICE O/P EST MOD 30 MIN: CPT | Mod: 95 | Performed by: FAMILY MEDICINE

## 2022-05-06 RX ORDER — LAMOTRIGINE 25 MG/1
TABLET ORAL
Qty: 60 TABLET | Refills: 0 | Status: SHIPPED | OUTPATIENT
Start: 2022-05-06 | End: 2022-07-05

## 2022-05-06 RX ORDER — EMTRICITABINE AND TENOFOVIR ALAFENAMIDE 200; 25 MG/1; MG/1
1 TABLET ORAL DAILY
COMMUNITY
Start: 2022-03-21

## 2022-05-06 RX ORDER — ESCITALOPRAM OXALATE 10 MG/1
10 TABLET ORAL DAILY
Qty: 30 TABLET | Refills: 0 | Status: SHIPPED | OUTPATIENT
Start: 2022-05-06 | End: 2022-07-05

## 2022-05-06 ASSESSMENT — ANXIETY QUESTIONNAIRES: GAD7 TOTAL SCORE: 5

## 2022-05-06 NOTE — Clinical Note
Please arrange the time for him to come in for BP check and he will also will drop the work form for me to fill out on Monday 5/9/22  Have him to follow up in a month for depression and anxiety.  Virtual visit is ok

## 2022-05-06 NOTE — PROGRESS NOTES
Froilan is a 32 year old who is being evaluated via a billable video visit.      How would you like to obtain your AVS? MyChart  If the video visit is dropped, the invitation should be resent by: N/A - switched to telephone visit      Assessment & Plan       ICD-10-CM    1. Generalized anxiety disorder  F41.1 escitalopram (LEXAPRO) 10 MG tablet     lamoTRIgine (LAMICTAL) 25 MG tablet   2. MDD (major depressive disorder), single episode, severe (H)  F32.2 escitalopram (LEXAPRO) 10 MG tablet     lamoTRIgine (LAMICTAL) 25 MG tablet   3. PTSD (post-traumatic stress disorder)  F43.10 escitalopram (LEXAPRO) 10 MG tablet     lamoTRIgine (LAMICTAL) 25 MG tablet   4. Essential hypertension, benign  I10        Wally has a complex psychiatric history.  He was recently hospitalized for suicidal ideation and was treated for depression, anxiety and PTSD.  The psychiatrist also concerned of bipolar depressive disorder.  He was on Prozac, Abilify and Seroquel for which he takes for about a month.  He stopped taking the medications because of perceived nightmares side effect.  Been having a lot of anxiety.  No anxiety attack.  No suicidal or homicidal ideation.  Denies of drugs or alcohol.    I again emphasized importance of taking medication prescribed.  Recommend counseling and psychiatry referral but he declined.  He did not want to go back on the Prozac, Abilify nor the Seroquel, but is willing to try different medication.  We will have him try the Lamictal in Lexapro.  Potential side effect discussed including Casey-Nathanael syndrome.  Something to be seen Sequent also discussed.,  Earlier if any concerns or question.    Will have him stop by to drop off the missing work form next week.  Also will have check the BP at that time.        32 minutes spent on the date of the encounter doing chart review, history and exam, documentation and further activities per the note       BMI:   Estimated body mass index is 29.26 kg/m  as  "calculated from the following:    Height as of 9/1/21: 1.854 m (6' 1\").    Weight as of 9/1/21: 100.6 kg (221 lb 12.8 oz).   Did not addressed at this visit      Return in about 1 month (around 6/6/2022) for folow up depression/anxiety.    Tammie Driscoll Mai, MD  [facility] NANCY Mcgovern is a 32 year old who presents for the following health issues     History of Present Illness       Mental Health Follow-up:  Patient presents to follow-up on Anxiety.    Patient's anxiety since last visit has been:  Better  The patient is not having other symptoms associated with anxiety.  Any significant life events: No  Patient is not feeling anxious or having panic attacks.  Patient has no concerns about alcohol or drug use.         Today's JAYDEN-7 Score: 5    Hypertension: He presents for follow up of hypertension.  He does check blood pressure  regularly outside of the clinic. Outside blood pressures have been over 140/90. He does not follow a low salt diet.     Migraines:   Since the patient's last clinic visit, headaches are: no change  The patient is getting headaches:  Every couple weeks  He is not able to do normal daily activities when he has a migraine.  The patient is taking the following rescue/relief medications:  Ibuprofen (Advil, Motrin)   Patient states \"I get some relief\" from the rescue/relief medications.   The patient is taking the following medications to prevent migraines:  No medications to prevent migraines  In the past 4 weeks, the patient has gone to an Urgent Care or Emergency Room 0 times times due to headaches.    He eats 2-3 servings of fruits and vegetables daily.He consumes 1 sweetened beverage(s) daily.He exercises with enough effort to increase his heart rate 30 to 60 minutes per day.  He exercises with enough effort to increase his heart rate 3 or less days per week. He is missing 1 dose(s) of medications per week.  He is not taking prescribed medications regularly due to " "cost of medication and side effects.      Wlaly has a complex psychiatric history who is seen to today for worsening of his mental health.  He is known to have depression, anxiety and PTSD.  He was admitted to the psychiatric unit about 6 months ago for alcohol intoxication with suicidal ideation.  He is known to have problem with medication noncompliance.  The Psychiatrist concerned of him having bipolar with depressive disorder.  He was started on Prozac and Abilify which worked well.  He also put on Seroquel for sleeping.    He took the medications for about a month, but then stopped taking them all because he believe they cause bad nightmare side effect.  Stated that he has been sleeping well without the medication.  However, been having bad anxiety daily that has affected his daily activity.  He not having anxiety attack.  No suicidal or homicidal ideation.  No hallucination.  No drugs or alcohol.  Not interested to go back to these medications again, but is willing to try something different for his anxiety and depression.  Denied of having mood swing problem.  Not interested counseling    Stated that couple weeks ago, he missed work for several days due to having severe anxiety breakdown.  He needs work note for it.  Stated he is in a new relationship of 6 months which has been going very well.  Not sure why he allowed his previous partner into his life who \"controlled\" his life completely.  He was very stressed out and had a nervous break down and could not going to work.  Stated everything is getting better now.  He is new partner has been supportive and he is happy.  He has a restraining order for his ex.    He also has high blood pressure and was on Zestoretic.  He did not take the med for couple months and BP was very high.  Restarted the blood pressure med couple weeks ago and his BP has been running around 140s/90s.  No headache or dizziness.  No acute change in vision.  No chest pain or shortness of " breath.  No leg swelling, orthopnea or dyspnea.       Review of Systems   Constitutional, HEENT, cardiovascular, pulmonary, gi and gu systems are negative, except as otherwise noted.      Objective           Vitals:  No vitals were obtained today due to virtual visit.    Physical Exam   GENERAL: Alert and no distress  RESP: No audible wheeze, cough, or increased work of breathing.    NEURO: Mentation and speech appropriate for age.  PSYCH: Mentation appears normal, affect normal/bright, judgement and insight intact, and normal speech.    No results found for any visits on 05/06/22.      Video-Visit Details    Type of service:  Telephone visit  Start time: 4:54 PM  Ended time: 5:16 PM  Total time: 20 minutes

## 2022-05-11 ENCOUNTER — TELEPHONE (OUTPATIENT)
Dept: FAMILY MEDICINE | Facility: CLINIC | Age: 32
End: 2022-05-11
Payer: COMMERCIAL

## 2022-05-11 NOTE — LETTER
[address] [address]  [phone]        May 12, 2022    Wally Flores  [address] [place] [id number]          Dear Wally,    We have attempted to reach you by phone as well. Please call the clinic at [phone] to schedule an appointment for our BP and then follow up in 1 month for depression and anxiety appointment as well. Okay to do virtual for depression follow up. Dr. Paul also has informed that he has not received your form that was supposed to be dropped off on 05/09/2022.     Thank you,     Dr. Paul   Care Team

## 2022-05-11 NOTE — TELEPHONE ENCOUNTER
Tammie Paul MD  [facility]  Please arrange the time for him to come in for BP check and he will also will drop the work form for me to fill out on Monday 5/9/22     Have him to follow up in a month for depression and anxiety.  Virtual visit is ok

## 2022-05-11 NOTE — TELEPHONE ENCOUNTER
Left message for patient to call back.  Please relay message below and and schedule appt.  Also let him know we have not received his form.

## 2022-05-12 NOTE — TELEPHONE ENCOUNTER
2nd attempt, unable to reach patient, letter and mychart message sent as well.   Anais Younger MA

## 2022-06-19 ENCOUNTER — HOSPITAL ENCOUNTER (EMERGENCY)
Facility: CLINIC | Age: 32
Discharge: HOME | End: 2022-06-20
Attending: FAMILY MEDICINE

## 2022-06-19 ENCOUNTER — APPOINTMENT (OUTPATIENT)
Dept: GENERAL RADIOLOGY | Facility: CLINIC | Age: 32
End: 2022-06-19
Attending: FAMILY MEDICINE

## 2022-06-19 DIAGNOSIS — S42.402A LEFT ELBOW FRACTURE, CLOSED, INITIAL ENCOUNTER: ICD-10-CM

## 2022-06-19 PROCEDURE — 73110 X-RAY EXAM OF WRIST: CPT | Mod: LT

## 2022-06-19 PROCEDURE — 24650 CLTX RDL HEAD/NCK FX WO MNPJ: CPT | Performed by: FAMILY MEDICINE

## 2022-06-19 PROCEDURE — 99284 EMERGENCY DEPT VISIT MOD MDM: CPT | Mod: 25 | Performed by: FAMILY MEDICINE

## 2022-06-19 PROCEDURE — 24650 CLTX RDL HEAD/NCK FX WO MNPJ: CPT | Mod: 54 | Performed by: FAMILY MEDICINE

## 2022-06-20 ENCOUNTER — APPOINTMENT (OUTPATIENT)
Dept: GENERAL RADIOLOGY | Facility: CLINIC | Age: 32
End: 2022-06-20
Attending: FAMILY MEDICINE

## 2022-06-20 VITALS
WEIGHT: 221 LBS | TEMPERATURE: 98.7 F | OXYGEN SATURATION: 99 % | DIASTOLIC BLOOD PRESSURE: 73 MMHG | SYSTOLIC BLOOD PRESSURE: 143 MMHG | BODY MASS INDEX: 29.16 KG/M2 | RESPIRATION RATE: 18 BRPM | HEART RATE: 84 BPM

## 2022-06-20 PROCEDURE — 73070 X-RAY EXAM OF ELBOW: CPT | Mod: LT

## 2022-06-20 PROCEDURE — 250N000013 HC RX MED GY IP 250 OP 250 PS 637: Performed by: FAMILY MEDICINE

## 2022-06-20 RX ORDER — OXYCODONE HYDROCHLORIDE 5 MG/1
5 TABLET ORAL EVERY 6 HOURS PRN
Qty: 16 TABLET | Refills: 0 | Status: SHIPPED | OUTPATIENT
Start: 2022-06-20 | End: 2022-06-24

## 2022-06-20 RX ORDER — OXYCODONE HYDROCHLORIDE 5 MG/1
10 TABLET ORAL ONCE
Status: COMPLETED | OUTPATIENT
Start: 2022-06-20 | End: 2022-06-20

## 2022-06-20 RX ADMIN — OXYCODONE HYDROCHLORIDE 10 MG: 5 TABLET ORAL at 01:07

## 2022-06-20 NOTE — DISCHARGE INSTRUCTIONS
You have a left elbow nondisplaced radial head fracture.  The wrist x-rays were normal and did not reveal an obvious fracture.  Keep the splint clean and dry, and use the immobilizer.  Elevate the arm is much as possible, and ice to reduce the swelling in the elbow.  Take ibuprofen up to 600 mg 4 times a day with food.  Reserve oxycodone for severe breakthrough pain.  Follow-up with Dr. Paul in 3-5 days for casting.

## 2022-06-20 NOTE — ED PROVIDER NOTES
"                                                            [facility] ED Provider Note   Patient: Wally Flores  MRN #:  [id number]  Date of Visit: June 19, 2022    CC:     Chief Complaint   Patient presents with     Arm Injury     HPI:  Wally Flores is a 32 year old male who presented to the emergency department for evaluation of injury to the left upper extremity after he fell down some steps.  Patient states that he had been drinking Saturday evening around 7:00, and their dog got underneath his feet.  He fell approximately 8 steps.  The hand railing had been removed since they were moving a bed up the stairs and they had not replaced it.  Patient has a rug burn to the left forehead, and he did not initially realize how bad his left arm injury was.  He states that he did not have transportation to come in earlier, and had to wait for a ride from his brother.  Patient reports that his last drink was yesterday.  His current pain level is 5 out of 10 at rest and \"15\" with movement.  Pain is in the left elbow, and proximal wrist.  He denies any neck, chest, back, or lower extremity pain.    Problem List:  Patient Active Problem List    Diagnosis Date Noted     MDD (major depressive disorder), single episode, severe (H) 08/11/2021     Priority: Medium     Alcohol dependence, episodic (H) 08/10/2021     Priority: Medium     Migraine with aura and without status migrainosus, not intractable 01/07/2020     Priority: Medium     Generalized anxiety disorder 07/11/2016     Priority: Medium     Essential hypertension, benign 06/16/2016     Priority: Medium     PTSD (post-traumatic stress disorder) 06/15/2015     Priority: Medium       Past Medical History:   Diagnosis Date     Herpes zoster without mention of complication 6th grade     Hypertension      MDD (major depressive disorder), single episode, severe (H) 8/11/2021     MEDICAL HISTORY OF -      Ostium secundum type atrial septal defect      Other " peripheral vascular disease(443.89)      Severe episode of recurrent major depressive disorder, without psychotic features (H) 8/10/2021     Tuberculous abscess of spinal cord, 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     Unspecified otitis media      Varicella without mention of complication 1989       MEDS: oxyCODONE (ROXICODONE) 5 MG tablet  atenolol-chlorthalidone (TENORETIC) 50-25 MG tablet  DESCOVY 200-25 MG per tablet  escitalopram (LEXAPRO) 10 MG tablet  lamoTRIgine (LAMICTAL) 25 MG tablet  SUMAtriptan (IMITREX) 50 MG tablet        ALLERGIES:    Allergies   Allergen Reactions     Penicillins Rash and Hives     Acetaminophen Rash       Past Surgical History:   Procedure Laterality Date     ADENOIDECTOMY  04/08/94     BACK SURGERY  4/11/2005     HC SPINAL PUNCTURE, THERAPEUTIC DRAINAGE CEREBROSPINAL FLUID  4/05    Drain Staph aureus epidural abscess     PE TUBES  04/08/94     TONSILLECTOMY  12/16/97     TYMPANOPLASTY, RT/LT  12/16/97    Tympanoplasty RT/LT       Social History     Tobacco Use     Smoking status: Never Smoker     Smokeless tobacco: Never Used   Vaping Use     Vaping Use: Never used   Substance Use Topics     Alcohol use: Yes     Alcohol/week: 0.0 standard drinks     Drug use: No         Review of Systems   Except as noted in HPI, all other systems were reviewed and are negative    Physical Exam     Vitals were reviewed  Patient Vitals for the past 12 hrs:   BP Temp Temp src Pulse Resp SpO2 Weight   06/20/22 0142 (!) 143/73 -- -- 84 18 99 % --   06/19/22 2346 (!) 155/111 98.7  F (37.1  C) Oral 95 18 99 % 100.2 kg (221 lb)     GENERAL APPEARANCE: Alert and oriented x3, GCS 15  FACE: Abrasion to the left forehead  EYES: Pupils are dilated, nonreactive, extraocular muscles are intact  HENT: normal external exam; TMs are normal  NECK: no adenopathy or asymmetry; no midline cervical spine tenderness  RESP: normal respiratory effort; clear breath sounds bilaterally  CV: regular rate and rhythm; no significant  murmurs, gallops or rubs  ABD: soft, no tenderness; no rebound or guarding; bowel sounds are normal  MS: no gross deformities noted; normal muscle tone.  EXT: Significant bruising to the left elbow posteriorly, with swelling; tenderness over the proximal left wrist.  No tenderness over the shoulder.  Normal radial pulse  SKIN: Bruising and swelling around the left elbow  NEURO: no facial droop; no focal deficits, speech is normal        Available Lab/Imaging Results     Results for orders placed or performed during the hospital encounter of 06/19/22 (from the past 24 hour(s))   XR Wrist Left G/E 3 Views    Narrative    EXAM: XR WRIST LEFT G/E 3 VIEWS  LOCATION: [facility]  DATE/TIME: 6/19/2022 11:54 PM    INDICATION: Tripped over his dog 28 hours ago, pain and swelling; rule out fracture  COMPARISON: None.      Impression    IMPRESSION: No visible fracture or dislocation.   Elbow XR,  2 views, left    Narrative    EXAM: XR ELBOW LT 2 VW  LOCATION: [facility]  DATE/TIME: 6/19/2022 11:56 PM    INDICATION: Fell down some steps 28 hours ago. Elbow pain.  COMPARISON: None.      Impression    IMPRESSION: Nondisplaced fracture radial head. Joint effusion. No dislocation.         Impression     Final diagnoses:   Left elbow nondisplaced fracture of radial head         ED Course & Medical Decision Making   Wally Flores is a 32 year old male who presented to the emergency department for evaluation of left upper extremity injury after he apparently tripped over his dog, and fell down 8 steps.  Injury occurred about 28 hours prior to arrival.  Patient had the banister of the steps removed because he had been try to move a bed up the stairs.  He had not yet replaced the handrail.  Patient had been drinking Saturday early evening, when this happened.  He has pain over the left elbow down to the wrist.  He could not move the wrist or the elbow and there was  significant bruising and swelling in the left elbow even while he was in the emergency department.  Patient does not have any neck, shoulder, back, or chest injury.  He has a rug burn to the left forehead.  Vital signs reveal a blood pressure of 155/111, temp 98.7, heart rate of 95, respiratory rate of 18 with 99% oxygen saturation.  Exam reveals abrasion to the left forehead.  The rest of the head and neck exam is unremarkable.  Lungs are clear cardiac exam is normal, no rib or back pain.  Left upper extremity reveals no tenderness over the left shoulder.  There is some significant bruising and swelling of the left elbow with pain upon flexion or extension of the elbow.  He is unable to supinate or pronate.  He has some pain into the distal forearm and wrist.  No obvious deformity there.  X-rays of the right elbow and right wrist reveals nondisplaced fracture of the radial head.  There is a joint effusion.  No dislocation.  Left wrist x-ray reveals no visible fracture or dislocation.  Patient was placed in a reverse sugar-tong Ortho-Glass splint by me.  He was then put into a sling for comfort.  He received 2 oxycodone tablets in the ED for severe pain.  Patient was advised to elevate as much as possible, continue with ibuprofen, and reserve oxycodone for severe pain.  He has an acetaminophen allergy.  His fracture is nonoperative, and he will follow-up with Dr. Paul in 3-5 days.  Patient's blood pressure was rechecked and had improved to 143/73 at the time of discharge.      Written after-visit summary and instructions were given at the time of discharge.    Follow up Plan:   Tammie Paul MD  [address] [place] [address] [phone]    In 4 days      [facility] Emergency Dept  [address] [place] [address] [phone]    If symptoms worsen      Discharge Instructions:   You have a left elbow nondisplaced radial head fracture.  The wrist x-rays were normal and did not  reveal an obvious fracture.  Keep the splint clean and dry, and use the immobilizer.  Elevate the arm is much as possible, and ice to reduce the swelling in the elbow.  Take ibuprofen up to 600 mg 4 times a day with food.  Reserve oxycodone for severe breakthrough pain.  Follow-up with Dr. Paul in 3-5 days for casting.       Disclaimer: This note consists of words and symbols derived from keyboarding and dictation using voice recognition software.  As a result, there may be errors that have gone undetected.  Please consider this when interpreting information found in this note.       Shauna Davila MD  06/20/22 0144

## 2022-06-20 NOTE — ED TRIAGE NOTES
Pt fell, tripping over Dog, after having a few drinks.  Pain and swelling to left wrist.  ( happened 28 hours ago)     Triage Assessment     Row Name 06/19/22 9945       Triage Assessment (Adult)    Airway WDL WDL       Respiratory WDL    Respiratory WDL WDL       Cardiac WDL    Cardiac WDL WDL

## 2022-06-21 ENCOUNTER — PATIENT OUTREACH (OUTPATIENT)
Dept: CARE COORDINATION | Facility: CLINIC | Age: 32
End: 2022-06-21

## 2022-06-21 DIAGNOSIS — Z71.89 OTHER SPECIFIED COUNSELING: ICD-10-CM

## 2022-06-21 NOTE — PROGRESS NOTES
Clinic Care Coordination Contact  [facility]/Voicemail       Clinical Data: Care Coordinator Outreach  Outreach attempted x 1.  Left message on patient's voicemail with call back information and requested return call.  Plan: Care Coordinator will try to reach patient again in 1-2 business days.        MARIELY Georges  [phone]  [facility]

## 2022-06-21 NOTE — PROGRESS NOTES
Clinic Care Coordination Contact  Community Health Worker Initial Outreach       Patient called CHW and left message. CHW tried returning call and left another voicemail. CHW will try second attempt tomorrow.      MARIELY Georges  [phone]  [facility]

## 2022-06-22 NOTE — PROGRESS NOTES
Clinic Care Coordination Contact  [facility]/Voicemail       Clinical Data: Care Coordinator Outreach  Outreach attempted x 2.  Left message on patient's voicemail with call back information and requested return call.  Plan: Care Coordinator will do no further outreaches at this time.        MARIELY Georges  [phone]  [facility]

## 2022-07-05 ENCOUNTER — OFFICE VISIT (OUTPATIENT)
Dept: ORTHOPEDICS | Facility: CLINIC | Age: 32
End: 2022-07-05

## 2022-07-05 ENCOUNTER — MYC REFILL (OUTPATIENT)
Dept: FAMILY MEDICINE | Facility: CLINIC | Age: 32
End: 2022-07-05

## 2022-07-05 VITALS
BODY MASS INDEX: 29.29 KG/M2 | DIASTOLIC BLOOD PRESSURE: 79 MMHG | SYSTOLIC BLOOD PRESSURE: 113 MMHG | WEIGHT: 221 LBS | HEIGHT: 73 IN | HEART RATE: 70 BPM

## 2022-07-05 DIAGNOSIS — F32.2 MDD (MAJOR DEPRESSIVE DISORDER), SINGLE EPISODE, SEVERE (H): ICD-10-CM

## 2022-07-05 DIAGNOSIS — S52.125A CLOSED NONDISPLACED FRACTURE OF HEAD OF LEFT RADIUS, INITIAL ENCOUNTER: ICD-10-CM

## 2022-07-05 DIAGNOSIS — I10 ESSENTIAL HYPERTENSION, BENIGN: ICD-10-CM

## 2022-07-05 DIAGNOSIS — S59.902A INJURY OF LEFT ELBOW, INITIAL ENCOUNTER: Primary | ICD-10-CM

## 2022-07-05 DIAGNOSIS — F41.1 GENERALIZED ANXIETY DISORDER: ICD-10-CM

## 2022-07-05 DIAGNOSIS — F43.10 PTSD (POST-TRAUMATIC STRESS DISORDER): ICD-10-CM

## 2022-07-05 PROCEDURE — 99203 OFFICE O/P NEW LOW 30 MIN: CPT | Mod: 57 | Performed by: ORTHOPAEDIC SURGERY

## 2022-07-05 PROCEDURE — 24650 CLTX RDL HEAD/NCK FX WO MNPJ: CPT | Mod: 55 | Performed by: ORTHOPAEDIC SURGERY

## 2022-07-05 NOTE — PROGRESS NOTES
Wally Flores is a 32 year old male who is seen in emergency room follow-up for left elbow injury.  He fell on 6/18/2022 when his dog got under his feet and he fell approximately 8 steps.  He came in the next day with x-ray showing a minimally displaced radial head fracture.  He was placed in a sugar-tong splint on the forearm.  He is somewhat uncomfortable in this as it does not immobilize the elbow.  He has occasional sharp shooting pains rated 4 out of 10.  He is right-hand dominant.  He is a     X-ray today shows no change in position of the radial head fracture.  It appears 1/2 to 1 mm impacted.  Overall very good position.  Previous x-ray of the wrist was unremarkable.  He is  at the wrist.    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    Staph aureus epidural abscess     Unspecified otitis media     Recurrent otitis     Varicella without mention of complication 1989       Past Surgical History:   Procedure Laterality Date     ADENOIDECTOMY  04/08/94     BACK SURGERY  4/11/2005     HC SPINAL PUNCTURE, THERAPEUTIC DRAINAGE CEREBROSPINAL FLUID  4/05    Drain Staph aureus epidural abscess     PE TUBES  04/08/94     TONSILLECTOMY  12/16/97     TYMPANOPLASTY, RT/LT  12/16/97    Tympanoplasty RT/LT       Family History   Problem Relation Age of Onset     Diabetes Mother      Hypertension Mother      Substance Abuse Mother      Cancer Maternal  Grandmother      Cancer - colorectal Maternal Grandfather      Cardiovascular Paternal Grandfather          at 32 from MI     No Known Problems Paternal Grandmother      Hypertension Brother      Substance Abuse Brother      Coronary Artery Disease Father 50     Heart Disease Father      Hypertension Father      Cerebrovascular Disease Father      Depression Father      Anxiety Disorder Father      Substance Abuse Father      Diabetes Other         PGGM     Hypertension Brother      Substance Abuse Brother      Hypertension Brother      Hypertension Brother        Social History     Socioeconomic History     Marital status: Single     Spouse name: Not on file     Number of children: Not on file     Years of education: Not on file     Highest education level: Not on file   Occupational History     Occupation: student   Tobacco Use     Smoking status: Never Smoker     Smokeless tobacco: Never Used   Vaping Use     Vaping Use: Never used   Substance and Sexual Activity     Alcohol use: Yes     Alcohol/week: 0.0 standard drinks     Drug use: No     Sexual activity: Not Currently     Comment: Single.  not working.    Other Topics Concern      Service No     Blood Transfusions No     Caffeine Concern No     Occupational Exposure No     Hobby Hazards No     Sleep Concern No     Stress Concern No     Weight Concern No     Special Diet No     Back Care No     Exercise Yes     Bike Helmet No     Seat Belt Yes     Self-Exams Not Asked     Parent/sibling w/ CABG, MI or angioplasty before 65F 55M? Not Asked   Social History Narrative     Not on file     Social Determinants of Health     Financial Resource Strain: Not on file   Food Insecurity: Not on file   Transportation Needs: Not on file   Physical Activity: Not on file   Stress: Not on file   Social Connections: Not on file   Intimate Partner Violence: Not on file   Housing Stability: Not on file       Current Outpatient Medications   Medication Sig Dispense  "Refill     atenolol-chlorthalidone (TENORETIC) 50-25 MG tablet TAKE 1 TABLET BY MOUTH DAILY 30 tablet 0     DESCOVY 200-25 MG per tablet Take 1 tablet by mouth daily       escitalopram (LEXAPRO) 10 MG tablet Take 1 tablet (10 mg) by mouth daily 30 tablet 0     lamoTRIgine (LAMICTAL) 25 MG tablet Take 1 tablet daily for 2 weeks then increase to 2 tablets daily 60 tablet 0     SUMAtriptan (IMITREX) 50 MG tablet Take 1 tablet (50 mg) by mouth at onset of headache for migraine May repeat in 2 hours. Max 4 tablets/24 hours. 12 tablet 4       Allergies   Allergen Reactions     Penicillins Rash and Hives     Acetaminophen Rash       REVIEW OF SYSTEMS:  CONSTITUTIONAL:  NEGATIVE for fever, chills, change in weight, not feeling tired  SKIN:  NEGATIVE for worrisome rashes, no skin lumps, no skin ulcers and no non-healing wounds  EYES:  NEGATIVE for vision changes or irritation.  ENT/MOUTH:  NEGATIVE.  No hearing loss, no hoarseness, no difficulty swallowing.  RESP:  NEGATIVE. No cough or shortness of breath.  CV:  NEGATIVE for chest pain, palpitations or peripheral edema  GI:  NEGATIVE for nausea, abdominal pain, heartburn, or change in bowel habits  :  Negative. No dysuria, no hematuria  MUSCULOSKELETAL:  See HPI above  NEURO:  NEGATIVE . No headaches, no dizziness,  no numbness  ENDOCRINE:  NEGATIVE for temperature intolerance, skin/hair changes  HEME/ALLERGY/IMMUNE:  NEGATIVE for bleeding problems  PSYCHIATRIC:  NEGATIVE. no anxiety, no depression.     Exam:  Vitals: /79   Pulse 70   Ht 1.854 m (6' 1\")   Wt 100.2 kg (221 lb)   BMI 29.16 kg/m    BMI= Body mass index is 29.16 kg/m .  Constitutional:  healthy, alert and no distress  Neuro: Alert and Oriented x 3, no focal defects.  Psych: Affect normal   Respiratory: Breathing not labored.  Cardiovascular: normal peripheral pulses  Lymph: no adenopathy  Skin: No rashes,worrisome lesions or skin problems'  He has tenderness at the elbow over the radial head.  He " has pain at the elbow with flexion extension and pronation supination.  He has only about a 20 degree arc of rotation.  He is tender over the distal radius.  He is not tender at the distal radial ulnar joint.    Assessment:  Left radial head fracture - nondisplaced.  Plan:  Long arm splint applied for better support at night.  Gentle range of motion.  Return to clinic 4 weeks with x-ray left elbow.

## 2022-07-05 NOTE — LETTER
[facility]  [address] [place]   [address]  Tel: [phone]   Fax: [phone]  2022    Wally Flores  [address] [place] [address]  [phone] (home)     : 1990          To Whom it May Concern:    Wally Flores was seen in our clinic today for a left radial head fracture that occurred on 22. No use of the left arm. Return to clinic in 4 weeks.  Please contact me for questions or concerns.    Sincerely,      Javier Dias MD  (electronically signed)

## 2022-07-05 NOTE — LETTER
7/5/2022         RE: Wally Flores  [address] [address]        Dear Colleague,    Thank you for referring your patient, Wally Flores, to the [facility]. Please see a copy of my visit note below.    Wally Flores is a 32 year old male who is seen in emergency room follow-up for left elbow injury.  He fell on 6/18/2022 when his dog got under his feet and he fell approximately 8 steps.  He came in the next day with x-ray showing a minimally displaced radial head fracture.  He was placed in a sugar-tong splint on the forearm.  He is somewhat uncomfortable in this as it does not immobilize the elbow.  He has occasional sharp shooting pains rated 4 out of 10.  He is right-hand dominant.  He is a     X-ray today shows no change in position of the radial head fracture.  It appears 1/2 to 1 mm impacted.  Overall very good position.  Previous x-ray of the wrist was unremarkable.  He is  at the wrist.    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    Staph aureus epidural abscess     Unspecified otitis media     Recurrent otitis     Varicella without mention of complication 1989       Past Surgical History:   Procedure Laterality Date     ADENOIDECTOMY  04/08/94     BACK SURGERY  4/11/2005     HC SPINAL PUNCTURE, THERAPEUTIC DRAINAGE CEREBROSPINAL FLUID  4/05    Drain Staph aureus epidural abscess     PE  TUBES  94     TONSILLECTOMY  97     TYMPANOPLASTY, RT/LT  97    Tympanoplasty RT/LT       Family History   Problem Relation Age of Onset     Diabetes Mother      Hypertension Mother      Substance Abuse Mother      Cancer Maternal Grandmother      Cancer - colorectal Maternal Grandfather      Cardiovascular Paternal Grandfather          at 32 from MI     No Known Problems Paternal Grandmother      Hypertension Brother      Substance Abuse Brother      Coronary Artery Disease Father 50     Heart Disease Father      Hypertension Father      Cerebrovascular Disease Father      Depression Father      Anxiety Disorder Father      Substance Abuse Father      Diabetes Other         PGGM     Hypertension Brother      Substance Abuse Brother      Hypertension Brother      Hypertension Brother        Social History     Socioeconomic History     Marital status: Single     Spouse name: Not on file     Number of children: Not on file     Years of education: Not on file     Highest education level: Not on file   Occupational History     Occupation: student   Tobacco Use     Smoking status: Never Smoker     Smokeless tobacco: Never Used   Vaping Use     Vaping Use: Never used   Substance and Sexual Activity     Alcohol use: Yes     Alcohol/week: 0.0 standard drinks     Drug use: No     Sexual activity: Not Currently     Comment: Single.  not working.    Other Topics Concern      Service No     Blood Transfusions No     Caffeine Concern No     Occupational Exposure No     Hobby Hazards No     Sleep Concern No     Stress Concern No     Weight Concern No     Special Diet No     Back Care No     Exercise Yes     Bike Helmet No     Seat Belt Yes     Self-Exams Not Asked     Parent/sibling w/ CABG, MI or angioplasty before 65F 55M? Not Asked   Social History Narrative     Not on file     Social Determinants of Health     Financial Resource Strain: Not on file   Food Insecurity: Not on file   Transportation  "Needs: Not on file   Physical Activity: Not on file   Stress: Not on file   Social Connections: Not on file   Intimate Partner Violence: Not on file   Housing Stability: Not on file       Current Outpatient Medications   Medication Sig Dispense Refill     atenolol-chlorthalidone (TENORETIC) 50-25 MG tablet TAKE 1 TABLET BY MOUTH DAILY 30 tablet 0     DESCOVY 200-25 MG per tablet Take 1 tablet by mouth daily       escitalopram (LEXAPRO) 10 MG tablet Take 1 tablet (10 mg) by mouth daily 30 tablet 0     lamoTRIgine (LAMICTAL) 25 MG tablet Take 1 tablet daily for 2 weeks then increase to 2 tablets daily 60 tablet 0     SUMAtriptan (IMITREX) 50 MG tablet Take 1 tablet (50 mg) by mouth at onset of headache for migraine May repeat in 2 hours. Max 4 tablets/24 hours. 12 tablet 4       Allergies   Allergen Reactions     Penicillins Rash and Hives     Acetaminophen Rash       REVIEW OF SYSTEMS:  CONSTITUTIONAL:  NEGATIVE for fever, chills, change in weight, not feeling tired  SKIN:  NEGATIVE for worrisome rashes, no skin lumps, no skin ulcers and no non-healing wounds  EYES:  NEGATIVE for vision changes or irritation.  ENT/MOUTH:  NEGATIVE.  No hearing loss, no hoarseness, no difficulty swallowing.  RESP:  NEGATIVE. No cough or shortness of breath.  CV:  NEGATIVE for chest pain, palpitations or peripheral edema  GI:  NEGATIVE for nausea, abdominal pain, heartburn, or change in bowel habits  :  Negative. No dysuria, no hematuria  MUSCULOSKELETAL:  See HPI above  NEURO:  NEGATIVE . No headaches, no dizziness,  no numbness  ENDOCRINE:  NEGATIVE for temperature intolerance, skin/hair changes  HEME/ALLERGY/IMMUNE:  NEGATIVE for bleeding problems  PSYCHIATRIC:  NEGATIVE. no anxiety, no depression.     Exam:  Vitals: /79   Pulse 70   Ht 1.854 m (6' 1\")   Wt 100.2 kg (221 lb)   BMI 29.16 kg/m    BMI= Body mass index is 29.16 kg/m .  Constitutional:  healthy, alert and no distress  Neuro: Alert and Oriented x 3, no focal " defects.  Psych: Affect normal   Respiratory: Breathing not labored.  Cardiovascular: normal peripheral pulses  Lymph: no adenopathy  Skin: No rashes,worrisome lesions or skin problems'  He has tenderness at the elbow over the radial head.  He has pain at the elbow with flexion extension and pronation supination.  He has only about a 20 degree arc of rotation.  He is tender over the distal radius.  He is not tender at the distal radial ulnar joint.    Assessment:  Left radial head fracture - nondisplaced.  Plan:  Long arm splint applied for better support at night.  Gentle range of motion.  Return to clinic 4 weeks with x-ray left elbow.      Again, thank you for allowing me to participate in the care of your patient.        Sincerely,        Javier Dias MD

## 2022-07-05 NOTE — PROGRESS NOTES
Cast/splint application    Date/Time: 7/5/2022 11:55 AM  Performed by: Roxana Peres ATC  Authorized by: Javier Dias MD     Consent:     Consent obtained:  Verbal    Consent given by:  Patient    Risks discussed:  Discoloration, numbness, pain and swelling    Alternatives discussed:  No treatment  Pre-procedure details:     Sensation:  Normal  Procedure details:     Laterality:  Left    Location:  Elbow    Elbow:  L elbow    Splint type:  Posterior slab    Supplies:  Fiberglass  Post-procedure details:     Pain:  Unchanged    Pain level:  1/10    Sensation:  Normal    Patient tolerance of procedure:  Tolerated well, no immediate complications    Patient provided with cast or splint care instructions: Yes

## 2022-07-05 NOTE — PATIENT INSTRUCTIONS
Use night splint to protect elbow.  Ok for gentle range of motion to elbow.  No pushing, pulling, lifting with left arm.  Return to clinic 4 weeks.

## 2022-07-07 ENCOUNTER — MYC MEDICAL ADVICE (OUTPATIENT)
Dept: FAMILY MEDICINE | Facility: CLINIC | Age: 32
End: 2022-07-07

## 2022-07-07 RX ORDER — ESCITALOPRAM OXALATE 10 MG/1
10 TABLET ORAL DAILY
Qty: 30 TABLET | Refills: 0 | Status: SHIPPED | OUTPATIENT
Start: 2022-07-07 | End: 2022-12-07

## 2022-07-07 RX ORDER — ATENOLOL AND CHLORTHALIDONE TABLET 50; 25 MG/1; MG/1
1 TABLET ORAL DAILY
Qty: 30 TABLET | Refills: 0 | Status: SHIPPED | OUTPATIENT
Start: 2022-07-07

## 2022-07-07 RX ORDER — LAMOTRIGINE 25 MG/1
TABLET ORAL
Qty: 60 TABLET | Refills: 0 | Status: SHIPPED | OUTPATIENT
Start: 2022-07-07 | End: 2022-12-07

## 2022-07-07 NOTE — TELEPHONE ENCOUNTER
Tenoretic  Routing refill request to provider for review/approval because:  Labs not current:  CRE, K+,Na+      Lexapro  Routing refill request to provider for review/approval because:  PHQ-9 score above 5      Lamictal  Routing refill request to provider for review/approval because:  Drug not on the [facility] refill protocol       Reno Peña RN

## 2022-07-07 NOTE — TELEPHONE ENCOUNTER
Patient informed via mychart to schedule, 7/12 reminder if not read to send letter.   Anais Younger MA

## 2022-07-18 ENCOUNTER — APPOINTMENT (OUTPATIENT)
Dept: GENERAL RADIOLOGY | Facility: CLINIC | Age: 32
End: 2022-07-18
Attending: EMERGENCY MEDICINE

## 2022-07-18 ENCOUNTER — HOSPITAL ENCOUNTER (EMERGENCY)
Facility: CLINIC | Age: 32
Discharge: HOME | End: 2022-07-18
Attending: EMERGENCY MEDICINE | Admitting: EMERGENCY MEDICINE

## 2022-07-18 VITALS
HEART RATE: 94 BPM | SYSTOLIC BLOOD PRESSURE: 161 MMHG | WEIGHT: 220 LBS | DIASTOLIC BLOOD PRESSURE: 104 MMHG | HEIGHT: 73 IN | RESPIRATION RATE: 16 BRPM | TEMPERATURE: 98.9 F | BODY MASS INDEX: 29.16 KG/M2 | OXYGEN SATURATION: 98 %

## 2022-07-18 DIAGNOSIS — M79.672 LEFT FOOT PAIN: ICD-10-CM

## 2022-07-18 PROCEDURE — 96372 THER/PROPH/DIAG INJ SC/IM: CPT | Performed by: EMERGENCY MEDICINE

## 2022-07-18 PROCEDURE — 99284 EMERGENCY DEPT VISIT MOD MDM: CPT | Performed by: EMERGENCY MEDICINE

## 2022-07-18 PROCEDURE — 250N000011 HC RX IP 250 OP 636: Performed by: EMERGENCY MEDICINE

## 2022-07-18 PROCEDURE — 73630 X-RAY EXAM OF FOOT: CPT | Mod: LT

## 2022-07-18 RX ORDER — KETOROLAC TROMETHAMINE 30 MG/ML
60 INJECTION, SOLUTION INTRAMUSCULAR; INTRAVENOUS ONCE
Status: COMPLETED | OUTPATIENT
Start: 2022-07-18 | End: 2022-07-18

## 2022-07-18 RX ORDER — CEPHALEXIN 500 MG/1
500 CAPSULE ORAL 4 TIMES DAILY
Qty: 28 CAPSULE | Refills: 0 | Status: SHIPPED | OUTPATIENT
Start: 2022-07-18 | End: 2022-07-25

## 2022-07-18 RX ORDER — TRAMADOL HYDROCHLORIDE 50 MG/1
50-100 TABLET ORAL EVERY 6 HOURS PRN
Qty: 15 TABLET | Refills: 0 | Status: SHIPPED | OUTPATIENT
Start: 2022-07-18 | End: 2022-07-21

## 2022-07-18 RX ADMIN — KETOROLAC TROMETHAMINE 60 MG: 30 INJECTION, SOLUTION INTRAMUSCULAR at 18:21

## 2022-07-18 NOTE — DISCHARGE INSTRUCTIONS
Use supportive boot as needed.  May use ibuprofen up to 600 mg 4 times a day for discomfort.  May also use Tylenol up to 1000 mg 4 times a day as needed.  For worse pain, may use the tramadol prescribed as needed.

## 2022-07-18 NOTE — ED PROVIDER NOTES
History     Chief Complaint   Patient presents with     Foot Pain     HPI  Wally Flores is a 32 year old male who presents with left foot pain.  This began yesterday.  The day before that and yesterday he was walking in the water on some uneven rocks that he feels may have caused this pain.  There was no direct trauma.  Pain is dull, achy and throbbing.  Pain is made worse with any attempted weightbearing.  Pain is severe with weightbearing.  He took 2 Advil this morning at 11 AM without relief    Allergies:  Allergies   Allergen Reactions     Penicillins Rash and Hives     Acetaminophen Rash       Problem List:    Patient Active Problem List    Diagnosis Date Noted     Closed nondisplaced fracture of head of left radius 07/05/2022     Priority: Medium     MDD (major depressive disorder), single episode, severe (H) 08/11/2021     Priority: Medium     Alcohol dependence, episodic (H) 08/10/2021     Priority: Medium     Migraine with aura and without status migrainosus, not intractable 01/07/2020     Priority: Medium     Generalized anxiety disorder 07/11/2016     Priority: Medium     Essential hypertension, benign 06/16/2016     Priority: Medium     PTSD (post-traumatic stress disorder) 06/15/2015     Priority: Medium        Past Medical History:    Past Medical History:   Diagnosis Date     Herpes zoster without mention of complication 6th grade     Hypertension      MDD (major depressive disorder), single episode, severe (H) 8/11/2021     MEDICAL HISTORY OF -      Ostium secundum type atrial septal defect      Other peripheral vascular disease(443.89)      Severe episode of recurrent major depressive disorder, without psychotic features (H) 8/10/2021     Tuberculous abscess of spinal cord, 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     Unspecified otitis media      Varicella without mention of complication 1989       Past Surgical History:    Past Surgical History:   Procedure Laterality Date     ADENOIDECTOMY  04/08/94  "    BACK SURGERY  2005     HC SPINAL PUNCTURE, THERAPEUTIC DRAINAGE CEREBROSPINAL FLUID      Drain Staph aureus epidural abscess     PE TUBES  94     TONSILLECTOMY  97     TYMPANOPLASTY, RT/LT  97    Tympanoplasty RT/LT       Family History:    Family History   Problem Relation Age of Onset     Diabetes Mother      Hypertension Mother      Substance Abuse Mother      Cancer Maternal Grandmother      Cancer - colorectal Maternal Grandfather      Cardiovascular Paternal Grandfather          at 32 from MI     No Known Problems Paternal Grandmother      Hypertension Brother      Substance Abuse Brother      Coronary Artery Disease Father 50     Heart Disease Father      Hypertension Father      Cerebrovascular Disease Father      Depression Father      Anxiety Disorder Father      Substance Abuse Father      Diabetes Other         PGGM     Hypertension Brother      Substance Abuse Brother      Hypertension Brother      Hypertension Brother        Social History:  Marital Status:  Single [1]  Social History     Tobacco Use     Smoking status: Never Smoker     Smokeless tobacco: Never Used   Vaping Use     Vaping Use: Never used   Substance Use Topics     Alcohol use: Yes     Alcohol/week: 0.0 standard drinks     Drug use: No        Medications:    cephALEXin (KEFLEX) 500 MG capsule  traMADol (ULTRAM) 50 MG tablet  atenolol-chlorthalidone (TENORETIC) 50-25 MG tablet  DESCOVY 200-25 MG per tablet  escitalopram (LEXAPRO) 10 MG tablet  lamoTRIgine (LAMICTAL) 25 MG tablet  SUMAtriptan (IMITREX) 50 MG tablet          Review of Systems  All other systems are reviewed and are negative    Physical Exam   BP: (!) 170/100  Pulse: 107  Temp: 98  F (36.7  C)  Resp: 18  Height: 185.4 cm (6' 1\")  Weight: 99.8 kg (220 lb)  SpO2: 98 %      Physical Exam  Vitals reviewed.   Constitutional:       General: He is not in acute distress.     Appearance: He is not diaphoretic.   HENT:      Head: Normocephalic and " atraumatic.   Eyes:      General: No scleral icterus.        Right eye: No discharge.         Left eye: No discharge.      Conjunctiva/sclera: Conjunctivae normal.   Pulmonary:      Effort: Pulmonary effort is normal.      Breath sounds: No stridor.   Musculoskeletal:      Cervical back: Normal range of motion.      Comments: The left foot reveals swelling over the top of the foot in the region of the distal second and third metatarsals.  There is tenderness in this region.  No significant warmth or erythema here.  No open areas.  Distal CMS intact   Skin:     General: Skin is warm and dry.      Findings: No rash.   Neurological:      Mental Status: He is alert.      Comments: Normal speech and mentation   Psychiatric:         Judgment: Judgment normal.         ED Course                 Procedures              Critical Care time:  none               Results for orders placed or performed during the hospital encounter of 07/18/22 (from the past 24 hour(s))   XR Foot Left 3 Views    Narrative    EXAM: XR FOOT LEFT G/E 3 VIEWS  LOCATION: [facility]  DATE/TIME: 7/18/2022 6:13 PM    INDICATION: Left foot pain and swelling.  COMPARISON: None.      Impression    IMPRESSION:   1.  Findings suspicious for a nondisplaced fracture of the second toe middle phalanx base. Correlation with point tenderness is recommended.  2.  Normal joint alignment.       Medications   ketorolac (TORADOL) injection 60 mg (60 mg Intramuscular Given 7/18/22 1821)       Assessments & Plan (with Medical Decision Making)  32-year-old male who presents with left foot pain around the region of the distal left second and third metatarsal heads.  He was walking in the water on some rocks before this occurred.  X-ray as above reveals suspicion for nondisplaced fracture of the second toe middle phalanx.  There is no point tenderness in this location however.  Point tenderness exists over the distal second metatarsal head at  the worst.  There is slightest bit of erythema on the top of the foot.  Underneath there is no obvious break in the skin.  No signs of acute emergency at this point.  We will get a cam walker boot for support to try to unload the foot.  Prescribed tramadol as needed for pain not relieved with over-the-counter pain medications.  Keflex prescribed in case there is a small break in the skin causing some cellulitis inflammation in the region.  Have recommended follow-up with podiatry if not improving over the next week     I have reviewed the nursing notes.    I have reviewed the findings, diagnosis, plan and need for follow up with the patient.       New Prescriptions    CEPHALEXIN (KEFLEX) 500 MG CAPSULE    Take 1 capsule (500 mg) by mouth 4 times daily for 7 days    TRAMADOL (ULTRAM) 50 MG TABLET    Take 1-2 tablets ( mg) by mouth every 6 hours as needed for severe pain       Final diagnoses:   Left foot pain       7/18/2022   [facility] EMERGENCY DEPT     Mariano Palomares MD  07/18/22 7829

## 2022-07-18 NOTE — ED TRIAGE NOTES
C/o severe foot pain L foot - can't walk on foot, denies known injury. Was in water with rocks and not sure if any injury.      Triage Assessment     Row Name 07/18/22 0221       Triage Assessment (Adult)    Airway WDL WDL       Respiratory WDL    Respiratory WDL WDL       Cardiac WDL    Cardiac WDL WDL

## 2022-07-19 ENCOUNTER — PATIENT OUTREACH (OUTPATIENT)
Dept: CARE COORDINATION | Facility: CLINIC | Age: 32
End: 2022-07-19

## 2022-07-19 DIAGNOSIS — Z71.89 OTHER SPECIFIED COUNSELING: ICD-10-CM

## 2022-07-19 NOTE — PROGRESS NOTES
Clinic Care Coordination Contact  [facility]/Voicemail       Clinical Data: Care Coordinator Outreach  Outreach attempted x 1.  Left message on patient's voicemail with call back information and requested return call.  Plan: Care Coordinator will try to reach patient again in 1-2 business days.    MARIELY Jones  [phone]  [facility]

## 2022-07-20 NOTE — PROGRESS NOTES
Clinic Care Coordination Contact  [facility]/Voicemail       Clinical Data: Care Coordinator Outreach  Outreach attempted x 2.  Left message on patient's voicemail with call back information and requested return call.  Plan: Care Coordinator will do no further outreaches at this time.    MARIELY Jones  [phone]  [facility]

## 2022-08-02 ENCOUNTER — OFFICE VISIT (OUTPATIENT)
Dept: ORTHOPEDICS | Facility: CLINIC | Age: 32
End: 2022-08-02

## 2022-08-02 ENCOUNTER — ANCILLARY PROCEDURE (OUTPATIENT)
Dept: GENERAL RADIOLOGY | Facility: CLINIC | Age: 32
End: 2022-08-02
Attending: ORTHOPAEDIC SURGERY

## 2022-08-02 VITALS — HEIGHT: 73 IN | BODY MASS INDEX: 29.16 KG/M2 | WEIGHT: 220 LBS | RESPIRATION RATE: 18 BRPM

## 2022-08-02 DIAGNOSIS — S52.125D CLOSED NONDISPLACED FRACTURE OF HEAD OF LEFT RADIUS WITH ROUTINE HEALING, SUBSEQUENT ENCOUNTER: Primary | ICD-10-CM

## 2022-08-02 DIAGNOSIS — S52.125D CLOSED NONDISPLACED FRACTURE OF HEAD OF LEFT RADIUS WITH ROUTINE HEALING, SUBSEQUENT ENCOUNTER: ICD-10-CM

## 2022-08-02 PROCEDURE — 73070 X-RAY EXAM OF ELBOW: CPT | Mod: TC | Performed by: RADIOLOGY

## 2022-08-02 PROCEDURE — 99207 PR FRACTURE CARE IN GLOBAL PERIOD: CPT | Performed by: ORTHOPAEDIC SURGERY

## 2022-08-02 NOTE — LETTER
8/2/2022         RE: Wally Flores  [address] [address]        Dear Colleague,    Thank you for referring your patient, Wally Flores, to the [facility]. Please see a copy of my visit note below.    Wally Flores is a 32 year old male who is seen in emergency room follow-up for left elbow injury.  He fell on 6/18/2022 when his dog got under his feet and he fell approximately 8 steps.  He came in the next day with x-ray showing a minimally displaced radial head fracture.  We treated with a long arm splint and gradual range of motion.  He complains of medial elbow pain. He is right-hand dominant.  He is a     X-ray today shows no change in position of the radial head fracture.  It appears 1/2 to 1 mm impacted.  Overall very good position.  Previous x-ray of the wrist was unremarkable.  He is  at the wrist.    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    Staph aureus epidural abscess     Unspecified otitis media     Recurrent otitis     Varicella without mention of complication 1989       Past Surgical History:   Procedure Laterality Date     ADENOIDECTOMY  04/08/94     BACK SURGERY  4/11/2005     HC SPINAL PUNCTURE, THERAPEUTIC DRAINAGE CEREBROSPINAL FLUID  4/05    Drain Staph aureus epidural abscess     PE TUBES  04/08/94     TONSILLECTOMY  12/16/97     TYMPANOPLASTY, RT/LT  12/16/97     Tympanoplasty RT/LT       Family History   Problem Relation Age of Onset     Diabetes Mother      Hypertension Mother      Substance Abuse Mother      Cancer Maternal Grandmother      Cancer - colorectal Maternal Grandfather      Cardiovascular Paternal Grandfather          at 32 from MI     No Known Problems Paternal Grandmother      Hypertension Brother      Substance Abuse Brother      Coronary Artery Disease Father 50     Heart Disease Father      Hypertension Father      Cerebrovascular Disease Father      Depression Father      Anxiety Disorder Father      Substance Abuse Father      Diabetes Other         PGGM     Hypertension Brother      Substance Abuse Brother      Hypertension Brother      Hypertension Brother        Social History     Socioeconomic History     Marital status: Single     Spouse name: Not on file     Number of children: Not on file     Years of education: Not on file     Highest education level: Not on file   Occupational History     Occupation: student   Tobacco Use     Smoking status: Never Smoker     Smokeless tobacco: Never Used   Vaping Use     Vaping Use: Never used   Substance and Sexual Activity     Alcohol use: Yes     Alcohol/week: 0.0 standard drinks     Drug use: No     Sexual activity: Not Currently     Comment: Single.  not working.    Other Topics Concern      Service No     Blood Transfusions No     Caffeine Concern No     Occupational Exposure No     Hobby Hazards No     Sleep Concern No     Stress Concern No     Weight Concern No     Special Diet No     Back Care No     Exercise Yes     Bike Helmet No     Seat Belt Yes     Self-Exams Not Asked     Parent/sibling w/ CABG, MI or angioplasty before 65F 55M? Not Asked   Social History Narrative     Not on file     Social Determinants of Health     Financial Resource Strain: Not on file   Food Insecurity: Not on file   Transportation Needs: Not on file   Physical Activity: Not on file   Stress: Not on file   Social  "Connections: Not on file   Intimate Partner Violence: Not on file   Housing Stability: Not on file       Current Outpatient Medications   Medication Sig Dispense Refill     atenolol-chlorthalidone (TENORETIC) 50-25 MG tablet Take 1 tablet by mouth daily 30 tablet 0     DESCOVY 200-25 MG per tablet Take 1 tablet by mouth daily       escitalopram (LEXAPRO) 10 MG tablet Take 1 tablet (10 mg) by mouth daily 30 tablet 0     lamoTRIgine (LAMICTAL) 25 MG tablet Take 1 tablet daily for 2 weeks then increase to 2 tablets daily 60 tablet 0     SUMAtriptan (IMITREX) 50 MG tablet Take 1 tablet (50 mg) by mouth at onset of headache for migraine May repeat in 2 hours. Max 4 tablets/24 hours. 12 tablet 4       Allergies   Allergen Reactions     Penicillins Rash and Hives     Acetaminophen Rash       REVIEW OF SYSTEMS:  CONSTITUTIONAL:  NEGATIVE for fever, chills, change in weight, not feeling tired  SKIN:  NEGATIVE for worrisome rashes, no skin lumps, no skin ulcers and no non-healing wounds  EYES:  NEGATIVE for vision changes or irritation.  ENT/MOUTH:  NEGATIVE.  No hearing loss, no hoarseness, no difficulty swallowing.  RESP:  NEGATIVE. No cough or shortness of breath.  CV:  NEGATIVE for chest pain, palpitations or peripheral edema  GI:  NEGATIVE for nausea, abdominal pain, heartburn, or change in bowel habits  :  Negative. No dysuria, no hematuria  MUSCULOSKELETAL:  See HPI above  NEURO:  NEGATIVE . No headaches, no dizziness,  no numbness  ENDOCRINE:  NEGATIVE for temperature intolerance, skin/hair changes  HEME/ALLERGY/IMMUNE:  NEGATIVE for bleeding problems  PSYCHIATRIC:  NEGATIVE. no anxiety, no depression.     Exam:  Vitals: Resp 18   Ht 1.854 m (6' 1\")   Wt 99.8 kg (220 lb)   BMI 29.03 kg/m    BMI= Body mass index is 29.03 kg/m .  Constitutional:  healthy, alert and no distress  Neuro: Alert and Oriented x 3, no focal defects.  Psych: Affect normal   Respiratory: Breathing not labored.  Cardiovascular: normal " peripheral pulses  Lymph: no adenopathy  Skin: No rashes,worrisome lesions or skin problems'  He has no tenderness at the elbow over the radial head.  He has mildly limited range of motion of elbow with flexion from  degrees.  Mild limit on pronation and supination.  He has medial elbow abrasio and complains of diffuse medial pain.    He reports depression and inability to afford medications.  I asked him to message his primary physician regarding social work or a medications program.    Assessment:  Left radial head fracture - nondisplaced.  Plan:  Continue more aggressive range of motion.  Return to clinic 4 weeks with clinical check of motion.  May resume work.      Again, thank you for allowing me to participate in the care of your patient.        Sincerely,        Javier Dias MD

## 2022-08-02 NOTE — PROGRESS NOTES
Wally Flores is a 32 year old male who is seen in emergency room follow-up for left elbow injury.  He fell on 6/18/2022 when his dog got under his feet and he fell approximately 8 steps.  He came in the next day with x-ray showing a minimally displaced radial head fracture.  We treated with a long arm splint and gradual range of motion.  He complains of medial elbow pain. He is right-hand dominant.  He is a     X-ray today shows no change in position of the radial head fracture.  It appears 1/2 to 1 mm impacted.  Overall very good position.  Previous x-ray of the wrist was unremarkable.  He is  at the wrist.    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    Staph aureus epidural abscess     Unspecified otitis media     Recurrent otitis     Varicella without mention of complication 1989       Past Surgical History:   Procedure Laterality Date     ADENOIDECTOMY  04/08/94     BACK SURGERY  4/11/2005     HC SPINAL PUNCTURE, THERAPEUTIC DRAINAGE CEREBROSPINAL FLUID  4/05    Drain Staph aureus epidural abscess     PE TUBES  04/08/94     TONSILLECTOMY  12/16/97     TYMPANOPLASTY, RT/LT  12/16/97    Tympanoplasty RT/LT       Family History   Problem Relation Age of Onset     Diabetes Mother      Hypertension Mother      Substance Abuse Mother      Cancer Maternal Grandmother      Cancer - colorectal Maternal Grandfather      Cardiovascular Paternal  Grandfather          at 32 from MI     No Known Problems Paternal Grandmother      Hypertension Brother      Substance Abuse Brother      Coronary Artery Disease Father 50     Heart Disease Father      Hypertension Father      Cerebrovascular Disease Father      Depression Father      Anxiety Disorder Father      Substance Abuse Father      Diabetes Other         PGGM     Hypertension Brother      Substance Abuse Brother      Hypertension Brother      Hypertension Brother        Social History     Socioeconomic History     Marital status: Single     Spouse name: Not on file     Number of children: Not on file     Years of education: Not on file     Highest education level: Not on file   Occupational History     Occupation: student   Tobacco Use     Smoking status: Never Smoker     Smokeless tobacco: Never Used   Vaping Use     Vaping Use: Never used   Substance and Sexual Activity     Alcohol use: Yes     Alcohol/week: 0.0 standard drinks     Drug use: No     Sexual activity: Not Currently     Comment: Single.  not working.    Other Topics Concern      Service No     Blood Transfusions No     Caffeine Concern No     Occupational Exposure No     Hobby Hazards No     Sleep Concern No     Stress Concern No     Weight Concern No     Special Diet No     Back Care No     Exercise Yes     Bike Helmet No     Seat Belt Yes     Self-Exams Not Asked     Parent/sibling w/ CABG, MI or angioplasty before 65F 55M? Not Asked   Social History Narrative     Not on file     Social Determinants of Health     Financial Resource Strain: Not on file   Food Insecurity: Not on file   Transportation Needs: Not on file   Physical Activity: Not on file   Stress: Not on file   Social Connections: Not on file   Intimate Partner Violence: Not on file   Housing Stability: Not on file       Current Outpatient Medications   Medication Sig Dispense Refill     atenolol-chlorthalidone (TENORETIC) 50-25 MG tablet Take 1 tablet by mouth daily  "30 tablet 0     DESCOVY 200-25 MG per tablet Take 1 tablet by mouth daily       escitalopram (LEXAPRO) 10 MG tablet Take 1 tablet (10 mg) by mouth daily 30 tablet 0     lamoTRIgine (LAMICTAL) 25 MG tablet Take 1 tablet daily for 2 weeks then increase to 2 tablets daily 60 tablet 0     SUMAtriptan (IMITREX) 50 MG tablet Take 1 tablet (50 mg) by mouth at onset of headache for migraine May repeat in 2 hours. Max 4 tablets/24 hours. 12 tablet 4       Allergies   Allergen Reactions     Penicillins Rash and Hives     Acetaminophen Rash       REVIEW OF SYSTEMS:  CONSTITUTIONAL:  NEGATIVE for fever, chills, change in weight, not feeling tired  SKIN:  NEGATIVE for worrisome rashes, no skin lumps, no skin ulcers and no non-healing wounds  EYES:  NEGATIVE for vision changes or irritation.  ENT/MOUTH:  NEGATIVE.  No hearing loss, no hoarseness, no difficulty swallowing.  RESP:  NEGATIVE. No cough or shortness of breath.  CV:  NEGATIVE for chest pain, palpitations or peripheral edema  GI:  NEGATIVE for nausea, abdominal pain, heartburn, or change in bowel habits  :  Negative. No dysuria, no hematuria  MUSCULOSKELETAL:  See HPI above  NEURO:  NEGATIVE . No headaches, no dizziness,  no numbness  ENDOCRINE:  NEGATIVE for temperature intolerance, skin/hair changes  HEME/ALLERGY/IMMUNE:  NEGATIVE for bleeding problems  PSYCHIATRIC:  NEGATIVE. no anxiety, no depression.     Exam:  Vitals: Resp 18   Ht 1.854 m (6' 1\")   Wt 99.8 kg (220 lb)   BMI 29.03 kg/m    BMI= Body mass index is 29.03 kg/m .  Constitutional:  healthy, alert and no distress  Neuro: Alert and Oriented x 3, no focal defects.  Psych: Affect normal   Respiratory: Breathing not labored.  Cardiovascular: normal peripheral pulses  Lymph: no adenopathy  Skin: No rashes,worrisome lesions or skin problems'  He has no tenderness at the elbow over the radial head.  He has mildly limited range of motion of elbow with flexion from  degrees.  Mild limit on pronation and " supination.  He has medial elbow abrasio and complains of diffuse medial pain.    He reports depression and inability to afford medications.  I asked him to message his primary physician regarding social work or a medications program.    Assessment:  Left radial head fracture - nondisplaced.  Plan:  Continue more aggressive range of motion.  Return to clinic 4 weeks with clinical check of motion.  May resume work.

## 2022-08-02 NOTE — PATIENT INSTRUCTIONS
Work on elbow range of motion.  Return to clinic 4 weeks to check range of motion.  Stretching exercises   Pronation and supination of the forearm: With your elbow bent 90 , turn your palm upward and hold for 5 seconds. Slowly turn your palm downward and hold for 5 seconds. Make sure you keep your elbow at your side and bent 90  throughout this exercise. Do 3 sets of 10.   Elbow range of motion: Gently bring your palm up toward your shoulder and bend your elbow as far as you can. Then straighten your elbow as far as you can 10 times. Do 3 sets of 10.   Strengthening exercises   Wrist flexion exercise: Hold a can or hammer handle in your hand with your palm facing up. Bend your wrist upward. Slowly lower the weight and return to the starting position. Do 3 sets of 10. Gradually increase the weight of the can or weight you are holding.   Wrist extension exercise: Hold a soup can or hammer handle in your hand with your palm facing down. Slowly bend your wrist upward. Slowly lower the weight down into the starting position. Do 3 sets of 10. Gradually increase the weight of the object you are holding.   Wrist radial deviation strengthening: Put your wrist in the sideways position with your thumb up. Hold a can of soup or a hammer handle and gently bend your wrist up, with the thumb reaching toward the ceiling. Slowly lower to the starting position. Do not move your forearm throughout this exercise. Do 3 sets of 10.   Forearm pronation and supination strengthening: Hold a soup can or hammer handle in your hand and bend your elbow 90 . Slowly rotate your hand with your palm upward and then palm down. Do 3 sets of 10.   Wrist extension (with broom handle): Stand up and hold a broom handle in both hands. With your arms at shoulder level, elbows straight and palms down, roll the broom handle backward in your hand as if you are reeling something in using a broom handle. Do 3 sets of 10.   Written by Mare Araya, MS, PT, for  [employer].   Published by [employer].   This content is reviewed periodically and is subject to change as new health information becomes available. The information is intended to inform and educate and is not a replacement for medical evaluation, advice, diagnosis or treatment by a healthcare professional.   Sports Medicine Advisor 2003.1 Index  Sports Medicine Advisor 2003.1 Credits   Copyright   2003 [employer]. All rights reserved.

## 2022-10-09 ENCOUNTER — HEALTH MAINTENANCE LETTER (OUTPATIENT)
Age: 32
End: 2022-10-09

## 2022-12-07 ENCOUNTER — HOSPITAL ENCOUNTER (EMERGENCY)
Facility: CLINIC | Age: 32
Discharge: HOME | End: 2022-12-07
Attending: FAMILY MEDICINE | Admitting: FAMILY MEDICINE
Payer: MEDICAID

## 2022-12-07 ENCOUNTER — APPOINTMENT (OUTPATIENT)
Dept: CT IMAGING | Facility: CLINIC | Age: 32
End: 2022-12-07
Attending: FAMILY MEDICINE
Payer: MEDICAID

## 2022-12-07 VITALS
SYSTOLIC BLOOD PRESSURE: 148 MMHG | TEMPERATURE: 99.4 F | RESPIRATION RATE: 20 BRPM | HEIGHT: 73 IN | OXYGEN SATURATION: 95 % | DIASTOLIC BLOOD PRESSURE: 95 MMHG | HEART RATE: 101 BPM | BODY MASS INDEX: 27.83 KG/M2 | WEIGHT: 210 LBS

## 2022-12-07 DIAGNOSIS — K29.20 ACUTE ALCOHOLIC GASTRITIS WITHOUT HEMORRHAGE: ICD-10-CM

## 2022-12-07 DIAGNOSIS — K85.90 ACUTE PANCREATITIS, UNSPECIFIED COMPLICATION STATUS, UNSPECIFIED PANCREATITIS TYPE: ICD-10-CM

## 2022-12-07 LAB
ALBUMIN SERPL-MCNC: 4.1 G/DL (ref 3.4–5)
ALBUMIN UR-MCNC: 30 MG/DL
ALP SERPL-CCNC: 78 U/L (ref 40–150)
ALT SERPL W P-5'-P-CCNC: 62 U/L (ref 0–70)
ANION GAP SERPL CALCULATED.3IONS-SCNC: 12 MMOL/L (ref 3–14)
APPEARANCE UR: CLEAR
AST SERPL W P-5'-P-CCNC: 55 U/L (ref 0–45)
BASOPHILS # BLD AUTO: 0.1 10E3/UL (ref 0–0.2)
BASOPHILS NFR BLD AUTO: 1 %
BILIRUB SERPL-MCNC: 2.3 MG/DL (ref 0.2–1.3)
BILIRUB UR QL STRIP: ABNORMAL
BUN SERPL-MCNC: 9 MG/DL (ref 7–30)
CALCIUM SERPL-MCNC: 9.4 MG/DL (ref 8.5–10.1)
CHLORIDE BLD-SCNC: 98 MMOL/L (ref 94–109)
CO2 SERPL-SCNC: 28 MMOL/L (ref 20–32)
COLOR UR AUTO: YELLOW
CREAT SERPL-MCNC: 0.84 MG/DL (ref 0.66–1.25)
EOSINOPHIL # BLD AUTO: 0 10E3/UL (ref 0–0.7)
EOSINOPHIL NFR BLD AUTO: 0 %
ERYTHROCYTE [DISTWIDTH] IN BLOOD BY AUTOMATED COUNT: 12.7 % (ref 10–15)
FLUAV RNA SPEC QL NAA+PROBE: NEGATIVE
FLUBV RNA RESP QL NAA+PROBE: NEGATIVE
GFR SERPL CREATININE-BSD FRML MDRD: >90 ML/MIN/1.73M2
GLUCOSE BLD-MCNC: 132 MG/DL (ref 70–99)
GLUCOSE UR STRIP-MCNC: NEGATIVE MG/DL
HCT VFR BLD AUTO: 43.2 % (ref 40–53)
HGB BLD-MCNC: 15 G/DL (ref 13.3–17.7)
HGB UR QL STRIP: NEGATIVE
IMM GRANULOCYTES # BLD: 0 10E3/UL
IMM GRANULOCYTES NFR BLD: 0 %
KETONES UR STRIP-MCNC: 80 MG/DL
LEUKOCYTE ESTERASE UR QL STRIP: NEGATIVE
LIPASE SERPL-CCNC: 510 U/L (ref 73–393)
LYMPHOCYTES # BLD AUTO: 1.9 10E3/UL (ref 0.8–5.3)
LYMPHOCYTES NFR BLD AUTO: 22 %
MAGNESIUM SERPL-MCNC: 1.5 MG/DL (ref 1.6–2.3)
MCH RBC QN AUTO: 32.9 PG (ref 26.5–33)
MCHC RBC AUTO-ENTMCNC: 34.7 G/DL (ref 31.5–36.5)
MCV RBC AUTO: 95 FL (ref 78–100)
MONOCYTES # BLD AUTO: 0.5 10E3/UL (ref 0–1.3)
MONOCYTES NFR BLD AUTO: 7 %
NEUTROPHILS # BLD AUTO: 5.8 10E3/UL (ref 1.6–8.3)
NEUTROPHILS NFR BLD AUTO: 70 %
NITRATE UR QL: NEGATIVE
NRBC # BLD AUTO: 0 10E3/UL
NRBC BLD AUTO-RTO: 0 /100
PH UR STRIP: 8.5 [PH] (ref 5–7)
PLATELET # BLD AUTO: 157 10E3/UL (ref 150–450)
POTASSIUM BLD-SCNC: 2.9 MMOL/L (ref 3.4–5.3)
PROT SERPL-MCNC: 7.7 G/DL (ref 6.8–8.8)
RBC # BLD AUTO: 4.56 10E6/UL (ref 4.4–5.9)
RBC URINE: 0 /HPF
RSV RNA SPEC NAA+PROBE: NEGATIVE
SARS-COV-2 RNA RESP QL NAA+PROBE: NEGATIVE
SODIUM SERPL-SCNC: 138 MMOL/L (ref 133–144)
SP GR UR STRIP: 1.02 (ref 1–1.03)
UROBILINOGEN UR STRIP-MCNC: NORMAL MG/DL
WBC # BLD AUTO: 8.3 10E3/UL (ref 4–11)
WBC URINE: 0 /HPF

## 2022-12-07 PROCEDURE — 250N000011 HC RX IP 250 OP 636: Performed by: FAMILY MEDICINE

## 2022-12-07 PROCEDURE — C9803 HOPD COVID-19 SPEC COLLECT: HCPCS | Performed by: FAMILY MEDICINE

## 2022-12-07 PROCEDURE — 99285 EMERGENCY DEPT VISIT HI MDM: CPT | Mod: CS | Performed by: FAMILY MEDICINE

## 2022-12-07 PROCEDURE — 36415 COLL VENOUS BLD VENIPUNCTURE: CPT | Performed by: FAMILY MEDICINE

## 2022-12-07 PROCEDURE — 83690 ASSAY OF LIPASE: CPT | Performed by: FAMILY MEDICINE

## 2022-12-07 PROCEDURE — 99285 EMERGENCY DEPT VISIT HI MDM: CPT | Mod: CS,25 | Performed by: FAMILY MEDICINE

## 2022-12-07 PROCEDURE — 80053 COMPREHEN METABOLIC PANEL: CPT | Performed by: FAMILY MEDICINE

## 2022-12-07 PROCEDURE — 87637 SARSCOV2&INF A&B&RSV AMP PRB: CPT | Performed by: FAMILY MEDICINE

## 2022-12-07 PROCEDURE — 74177 CT ABD & PELVIS W/CONTRAST: CPT

## 2022-12-07 PROCEDURE — 83735 ASSAY OF MAGNESIUM: CPT | Performed by: FAMILY MEDICINE

## 2022-12-07 PROCEDURE — 96374 THER/PROPH/DIAG INJ IV PUSH: CPT | Mod: 59 | Performed by: FAMILY MEDICINE

## 2022-12-07 PROCEDURE — 96361 HYDRATE IV INFUSION ADD-ON: CPT | Performed by: FAMILY MEDICINE

## 2022-12-07 PROCEDURE — 96375 TX/PRO/DX INJ NEW DRUG ADDON: CPT | Performed by: FAMILY MEDICINE

## 2022-12-07 PROCEDURE — 250N000009 HC RX 250: Performed by: FAMILY MEDICINE

## 2022-12-07 PROCEDURE — 81003 URINALYSIS AUTO W/O SCOPE: CPT | Performed by: FAMILY MEDICINE

## 2022-12-07 PROCEDURE — 85025 COMPLETE CBC W/AUTO DIFF WBC: CPT | Performed by: FAMILY MEDICINE

## 2022-12-07 PROCEDURE — 250N000013 HC RX MED GY IP 250 OP 250 PS 637: Performed by: FAMILY MEDICINE

## 2022-12-07 PROCEDURE — 258N000003 HC RX IP 258 OP 636: Performed by: FAMILY MEDICINE

## 2022-12-07 RX ORDER — SODIUM CHLORIDE 9 MG/ML
INJECTION, SOLUTION INTRAVENOUS CONTINUOUS
Status: DISCONTINUED | OUTPATIENT
Start: 2022-12-07 | End: 2022-12-07 | Stop reason: HOSPADM

## 2022-12-07 RX ORDER — OXYCODONE HYDROCHLORIDE 5 MG/1
5 TABLET ORAL EVERY 6 HOURS PRN
Qty: 6 TABLET | Refills: 0 | Status: SHIPPED | OUTPATIENT
Start: 2022-12-07 | End: 2022-12-07

## 2022-12-07 RX ORDER — POTASSIUM CHLORIDE 7.45 MG/ML
10 INJECTION INTRAVENOUS CONTINUOUS
Status: DISCONTINUED | OUTPATIENT
Start: 2022-12-07 | End: 2022-12-07 | Stop reason: HOSPADM

## 2022-12-07 RX ORDER — ONDANSETRON 4 MG/1
4 TABLET, ORALLY DISINTEGRATING ORAL EVERY 8 HOURS PRN
Qty: 10 TABLET | Refills: 0 | Status: SHIPPED | OUTPATIENT
Start: 2022-12-07 | End: 2022-12-07

## 2022-12-07 RX ORDER — KETOROLAC TROMETHAMINE 30 MG/ML
30 INJECTION, SOLUTION INTRAMUSCULAR; INTRAVENOUS ONCE
Status: COMPLETED | OUTPATIENT
Start: 2022-12-07 | End: 2022-12-07

## 2022-12-07 RX ORDER — HYDROMORPHONE HYDROCHLORIDE 1 MG/ML
0.5 INJECTION, SOLUTION INTRAMUSCULAR; INTRAVENOUS; SUBCUTANEOUS ONCE
Status: COMPLETED | OUTPATIENT
Start: 2022-12-07 | End: 2022-12-07

## 2022-12-07 RX ORDER — OXYCODONE HYDROCHLORIDE 5 MG/1
5 TABLET ORAL EVERY 6 HOURS PRN
Qty: 6 TABLET | Refills: 0 | Status: SHIPPED | OUTPATIENT
Start: 2022-12-07

## 2022-12-07 RX ORDER — ONDANSETRON 2 MG/ML
4 INJECTION INTRAMUSCULAR; INTRAVENOUS EVERY 30 MIN PRN
Status: DISCONTINUED | OUTPATIENT
Start: 2022-12-07 | End: 2022-12-07 | Stop reason: HOSPADM

## 2022-12-07 RX ORDER — ONDANSETRON 4 MG/1
4 TABLET, ORALLY DISINTEGRATING ORAL EVERY 8 HOURS PRN
Qty: 10 TABLET | Refills: 0 | Status: SHIPPED | OUTPATIENT
Start: 2022-12-07

## 2022-12-07 RX ORDER — IOPAMIDOL 755 MG/ML
100 INJECTION, SOLUTION INTRAVASCULAR ONCE
Status: COMPLETED | OUTPATIENT
Start: 2022-12-07 | End: 2022-12-07

## 2022-12-07 RX ORDER — POTASSIUM CHLORIDE 1500 MG/1
40 TABLET, EXTENDED RELEASE ORAL ONCE
Status: COMPLETED | OUTPATIENT
Start: 2022-12-07 | End: 2022-12-07

## 2022-12-07 RX ADMIN — SODIUM CHLORIDE 60 ML: 9 INJECTION, SOLUTION INTRAVENOUS at 03:19

## 2022-12-07 RX ADMIN — IOPAMIDOL 100 ML: 755 INJECTION, SOLUTION INTRAVENOUS at 03:18

## 2022-12-07 RX ADMIN — POTASSIUM CHLORIDE 40 MEQ: 1500 TABLET, EXTENDED RELEASE ORAL at 03:34

## 2022-12-07 RX ADMIN — POTASSIUM CHLORIDE 10 MEQ: 7.46 INJECTION, SOLUTION INTRAVENOUS at 03:34

## 2022-12-07 RX ADMIN — HYDROMORPHONE HYDROCHLORIDE 0.5 MG: 1 INJECTION, SOLUTION INTRAMUSCULAR; INTRAVENOUS; SUBCUTANEOUS at 04:06

## 2022-12-07 RX ADMIN — ALUMINUM HYDROXIDE, MAGNESIUM HYDROXIDE, AND SIMETHICONE 30 ML: 200; 200; 20 SUSPENSION ORAL at 03:49

## 2022-12-07 RX ADMIN — ONDANSETRON 4 MG: 2 INJECTION INTRAMUSCULAR; INTRAVENOUS at 02:49

## 2022-12-07 RX ADMIN — SODIUM CHLORIDE: 9 INJECTION, SOLUTION INTRAVENOUS at 03:51

## 2022-12-07 RX ADMIN — KETOROLAC TROMETHAMINE 30 MG: 30 INJECTION, SOLUTION INTRAMUSCULAR; INTRAVENOUS at 03:34

## 2022-12-07 RX ADMIN — SODIUM CHLORIDE 1000 ML: 9 INJECTION, SOLUTION INTRAVENOUS at 02:49

## 2022-12-07 RX ADMIN — PROCHLORPERAZINE EDISYLATE 10 MG: 5 INJECTION INTRAMUSCULAR; INTRAVENOUS at 02:50

## 2022-12-07 ASSESSMENT — ACTIVITIES OF DAILY LIVING (ADL)
ADLS_ACUITY_SCORE: 35
ADLS_ACUITY_SCORE: 35

## 2022-12-07 NOTE — ED NOTES
Pt unable to find ride at this time, left several messages with family ... waiting for call back.  Pt states he doesn't have money for co-pay on medications , will wait and see if family can help him out with that when they come to pick him up

## 2022-12-07 NOTE — ED PROVIDER NOTES
History     Chief Complaint   Patient presents with     Abdominal Pain     Vomiting     HPI  Wally Flores is a 32 year old male who presents with nausea vomiting abdominal pain that started yesterday morning.  Patient is also had some subjective fever since yesterday.  Patient is thrown up 5-10 times since yesterday.  Denies any blood or coffee-ground stuff in his vomit.  Patient last had a bowel movement yesterday which was normal.  Denies any dysuria or hematuria.  Denies any chest pain or back pain.    Allergies:  Allergies   Allergen Reactions     Penicillins Rash and Hives     Acetaminophen Rash       Problem List:    Patient Active Problem List    Diagnosis Date Noted     Closed nondisplaced fracture of head of left radius 07/05/2022     Priority: Medium     MDD (major depressive disorder), single episode, severe (H) 08/11/2021     Priority: Medium     Alcohol dependence, episodic (H) 08/10/2021     Priority: Medium     Migraine with aura and without status migrainosus, not intractable 01/07/2020     Priority: Medium     Generalized anxiety disorder 07/11/2016     Priority: Medium     Essential hypertension, benign 06/16/2016     Priority: Medium     PTSD (post-traumatic stress disorder) 06/15/2015     Priority: Medium        Past Medical History:    Past Medical History:   Diagnosis Date     Herpes zoster without mention of complication 6th grade     Hypertension      MDD (major depressive disorder), single episode, severe (H) 8/11/2021     MEDICAL HISTORY OF -      Ostium secundum type atrial septal defect      Other peripheral vascular disease(443.89)      Severe episode of recurrent major depressive disorder, without psychotic features (H) 8/10/2021     Tuberculous abscess of spinal cord, 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     Unspecified otitis media      Varicella without mention of complication 1989       Past Surgical History:    Past Surgical History:   Procedure Laterality Date     ADENOIDECTOMY   "94     BACK SURGERY  2005     HC SPINAL PUNCTURE, THERAPEUTIC DRAINAGE CEREBROSPINAL FLUID      Drain Staph aureus epidural abscess     PE TUBES  94     TONSILLECTOMY  97     TYMPANOPLASTY, RT/LT  97    Tympanoplasty RT/LT       Family History:    Family History   Problem Relation Age of Onset     Diabetes Mother      Hypertension Mother      Substance Abuse Mother      Cancer Maternal Grandmother      Cancer - colorectal Maternal Grandfather      Cardiovascular Paternal Grandfather          at 32 from MI     No Known Problems Paternal Grandmother      Hypertension Brother      Substance Abuse Brother      Coronary Artery Disease Father 50     Heart Disease Father      Hypertension Father      Cerebrovascular Disease Father      Depression Father      Anxiety Disorder Father      Substance Abuse Father      Diabetes Other         PGGM     Hypertension Brother      Substance Abuse Brother      Hypertension Brother      Hypertension Brother        Social History:  Marital Status:  Single [1]  Social History     Tobacco Use     Smoking status: Never     Smokeless tobacco: Never   Vaping Use     Vaping Use: Never used   Substance Use Topics     Alcohol use: Yes     Alcohol/week: 0.0 standard drinks     Drug use: No        Medications:    atenolol-chlorthalidone (TENORETIC) 50-25 MG tablet  DESCOVY 200-25 MG per tablet  SUMAtriptan (IMITREX) 50 MG tablet          Review of Systems   All other systems reviewed and are negative.      Physical Exam   Pulse: 102  Temp: 99.4  F (37.4  C)  Resp: 20  Height: 185.4 cm (6' 1\")  Weight: 95.3 kg (210 lb)  SpO2: 96 %      Physical Exam  Vitals and nursing note reviewed.   Constitutional:       General: He is not in acute distress.     Appearance: He is well-developed and well-nourished. He is not diaphoretic.   HENT:      Head: Normocephalic and atraumatic.      Nose: Nose normal.      Mouth/Throat:      Mouth: Oropharynx is clear and moist.      " Pharynx: No oropharyngeal exudate.   Eyes:      General: No scleral icterus.     Conjunctiva/sclera: Conjunctivae normal.      Pupils: Pupils are equal, round, and reactive to light.   Cardiovascular:      Rate and Rhythm: Normal rate and regular rhythm.      Pulses: Intact distal pulses.      Heart sounds: Normal heart sounds. No murmur heard.    No friction rub.   Pulmonary:      Effort: Pulmonary effort is normal. No respiratory distress.      Breath sounds: Normal breath sounds. No wheezing or rales.   Abdominal:      General: Bowel sounds are normal. There is no distension.      Palpations: Abdomen is soft. There is no mass.      Tenderness: There is no abdominal tenderness. There is no guarding or rebound.   Musculoskeletal:         General: No tenderness or edema. Normal range of motion.      Cervical back: Normal range of motion.   Skin:     General: Skin is warm.      Findings: No rash.   Neurological:      Mental Status: He is alert and oriented to person, place, and time.   Psychiatric:         Judgment: Judgment normal.         ED Course                 Procedures      Results for orders placed or performed during the hospital encounter of 12/07/22 (from the past 24 hour(s))   Asymptomatic Influenza A/B & SARS-CoV2 (COVID-19) Virus PCR Multiplex Nose    Specimen: Nose; Swab   Result Value Ref Range    Influenza A PCR Negative Negative    Influenza B PCR Negative Negative    RSV PCR Negative Negative    SARS CoV2 PCR Negative Negative    Narrative    Testing was performed using the Xpert Xpress CoV2/Flu/RSV Assay on the [employer] app GeneXpert Instrument. This test should be ordered for the detection of SARS-CoV-2 and influenza viruses in individuals who meet clinical and/or epidemiological criteria. Test performance is unknown in asymptomatic patients. This test is for in vitro diagnostic use under the FDA EUA for laboratories certified under CLIA to perform high or moderate complexity testing. This test has  not been FDA cleared or approved. A negative result does not rule out the presence of PCR inhibitors in the specimen or target RNA in concentration below the limit of detection for the assay. If only one viral target is positive but coinfection with multiple targets is suspected, the sample should be re-tested with another FDA cleared, approved, or authorized test, if coinfection would change clinical management. This test was validated by the [facility]. These laboratories are certified under the Clinical Laboratory Improvement Amendments of 1988 (CLIA-88) as qualified to perform high complexity laboratory testing.   CBC with platelets differential    Narrative    The following orders were created for panel order CBC with platelets differential.  Procedure                               Abnormality         Status                     ---------                               -----------         ------                     CBC with platelets and d...[[id number]]                      Final result                 Please view results for these tests on the individual orders.   Comprehensive metabolic panel   Result Value Ref Range    Sodium 138 133 - 144 mmol/L    Potassium 2.9 (L) 3.4 - 5.3 mmol/L    Chloride 98 94 - 109 mmol/L    Carbon Dioxide (CO2) 28 20 - 32 mmol/L    Anion Gap 12 3 - 14 mmol/L    Urea Nitrogen 9 7 - 30 mg/dL    Creatinine 0.84 0.66 - 1.25 mg/dL    Calcium 9.4 8.5 - 10.1 mg/dL    Glucose 132 (H) 70 - 99 mg/dL    Alkaline Phosphatase 78 40 - 150 U/L    AST 55 (H) 0 - 45 U/L    ALT 62 0 - 70 U/L    Protein Total 7.7 6.8 - 8.8 g/dL    Albumin 4.1 3.4 - 5.0 g/dL    Bilirubin Total 2.3 (H) 0.2 - 1.3 mg/dL    GFR Estimate >90 >60 mL/min/1.73m2   Lipase   Result Value Ref Range    Lipase 510 (H) 73 - 393 U/L   Magnesium   Result Value Ref Range    Magnesium 1.5 (L) 1.6 - 2.3 mg/dL   CBC with platelets and differential   Result Value Ref Range    WBC Count 8.3 4.0 - 11.0 10e3/uL    RBC Count  4.56 4.40 - 5.90 10e6/uL    Hemoglobin 15.0 13.3 - 17.7 g/dL    Hematocrit 43.2 40.0 - 53.0 %    MCV 95 78 - 100 fL    MCH 32.9 26.5 - 33.0 pg    MCHC 34.7 31.5 - 36.5 g/dL    RDW 12.7 10.0 - 15.0 %    Platelet Count 157 150 - 450 10e3/uL    % Neutrophils 70 %    % Lymphocytes 22 %    % Monocytes 7 %    % Eosinophils 0 %    % Basophils 1 %    % Immature Granulocytes 0 %    NRBCs per 100 WBC 0 <1 /100    Absolute Neutrophils 5.8 1.6 - 8.3 10e3/uL    Absolute Lymphocytes 1.9 0.8 - 5.3 10e3/uL    Absolute Monocytes 0.5 0.0 - 1.3 10e3/uL    Absolute Eosinophils 0.0 0.0 - 0.7 10e3/uL    Absolute Basophils 0.1 0.0 - 0.2 10e3/uL    Absolute Immature Granulocytes 0.0 <=0.4 10e3/uL    Absolute NRBCs 0.0 10e3/uL   CT ABDOMEN PELVIS W CONTRAST    Narrative    EXAM: CT ABDOMEN PELVIS W CONTRAST  LOCATION: [facility]  DATE/TIME: 12/7/2022 3:31 AM    INDICATION: upper abd pain  COMPARISON: None.  TECHNIQUE: CT scan of the abdomen and pelvis was performed following injection of IV contrast. Multiplanar reformats were obtained. Dose reduction techniques were used.  CONTRAST: 100mL Isovue 370    FINDINGS:   LOWER CHEST: Normal.    HEPATOBILIARY: Diffuse hepatic steatosis. Unremarkable gallbladder.    PANCREAS: Normal.    SPLEEN: Normal.    ADRENAL GLANDS: Normal.    KIDNEYS/BLADDER: Normal.    BOWEL: Thickening of the distal esophagus. No evidence of bowel obstruction. Normal appendix.    LYMPH NODES: Normal.    VASCULATURE: Unremarkable.    PELVIC ORGANS: Normal.    MUSCULOSKELETAL: Small fat-containing left inguinal hernia. Tiny fat-containing umbilical hernia. No acute osseous lesion.      Impression    IMPRESSION:   1.  Thickening of the distal esophagus which may be seen with esophagitis, however consider correlation with endoscopy to exclude any underlying mass lesion.  2.  Diffuse hepatic steatosis.       Medications   0.9% sodium chloride BOLUS (0 mLs Intravenous Stopped 12/7/22 0351)      Followed by   sodium chloride 0.9% infusion ( Intravenous New Bag 12/7/22 0351)   ondansetron (ZOFRAN) injection 4 mg (4 mg Intravenous Given 12/7/22 0249)   potassium chloride 10 mEq in 100 mL sterile water infusion (10 mEq Intravenous New Bag 12/7/22 0334)   HYDROmorphone (PF) (DILAUDID) injection 0.5 mg (has no administration in time range)   prochlorperazine (COMPAZINE) injection 10 mg (10 mg Intravenous Given 12/7/22 0250)   potassium chloride ER (KLOR-CON M) CR tablet 40 mEq (40 mEq Oral Given 12/7/22 0334)   new 100 ml saline bag (60 mLs Intravenous Given 12/7/22 0319)   iopamidol (ISOVUE-370) solution 100 mL (100 mLs Intravenous Given 12/7/22 0318)   ketorolac (TORADOL) injection 30 mg (30 mg Intravenous Given 12/7/22 0334)   lidocaine (viscous) (XYLOCAINE) 2 % 15 mL, alum & mag hydroxide-simethicone (MAALOX) 15 mL GI Cocktail (30 mLs Oral Given 12/7/22 0349)       Labs have come back and patient's lipase was slightly elevated.  Potassium was also a little bit low.  CT shows findings of some thickening of the distal esophagus.  Talking to the patient he did drink some alcohol over the weekend, he has a history of alcohol abuse in the past.  I think patient does have very mild degree of pancreatitis which is contributing to his symptoms and some gastritis.  His lipase is less than twice the upper limit of normal so I think that we can manage this as an outpatient.  We will plan on starting the patient on Prilosec and patient will be on clear liquids for the next 24 hours and slowly advance as tolerated.  Patient was strongly encouraged to avoid any further alcohol use.  Patient will be discharged at this time.    Assessments & Plan (with Medical Decision Making)  Mild pancreatitis, gastritis     I have reviewed the nursing notes.    I have reviewed the findings, diagnosis, plan and need for follow up with the patient.              12/7/2022   [facility] EMERGENCY DEPT     Yvon Tripp  MD [place]  12/07/22 0411

## 2023-01-18 ENCOUNTER — HOSPITAL ENCOUNTER (EMERGENCY)
Facility: CLINIC | Age: 33
Discharge: HOME | End: 2023-01-18
Attending: EMERGENCY MEDICINE | Admitting: EMERGENCY MEDICINE
Payer: MEDICAID

## 2023-01-18 VITALS
DIASTOLIC BLOOD PRESSURE: 93 MMHG | RESPIRATION RATE: 20 BRPM | HEART RATE: 88 BPM | TEMPERATURE: 97.4 F | SYSTOLIC BLOOD PRESSURE: 147 MMHG | OXYGEN SATURATION: 97 %

## 2023-01-18 DIAGNOSIS — F10.20 ALCOHOL DEPENDENCE, EPISODIC (H): ICD-10-CM

## 2023-01-18 DIAGNOSIS — F41.1 GENERALIZED ANXIETY DISORDER: ICD-10-CM

## 2023-01-18 DIAGNOSIS — F10.920 ALCOHOLIC INTOXICATION WITHOUT COMPLICATION (H): ICD-10-CM

## 2023-01-18 LAB
ALCOHOL BREATH TEST: 0.31 (ref 0–0.01)
AMPHETAMINES UR QL SCN: ABNORMAL
BARBITURATES UR QL SCN: ABNORMAL
BENZODIAZ UR QL SCN: ABNORMAL
BUPRENORPHINE UR QL: NORMAL
BZE UR QL SCN: ABNORMAL
CANNABINOIDS UR QL SCN: ABNORMAL
METHADONE UR QL SCN: NORMAL
OPIATES UR QL SCN: ABNORMAL
OXYCODONE UR QL: NORMAL
PCP QUAL URINE (ROCHE): ABNORMAL

## 2023-01-18 PROCEDURE — 80307 DRUG TEST PRSMV CHEM ANLYZR: CPT | Mod: XU | Performed by: EMERGENCY MEDICINE

## 2023-01-18 PROCEDURE — 80307 DRUG TEST PRSMV CHEM ANLYZR: CPT | Performed by: EMERGENCY MEDICINE

## 2023-01-18 PROCEDURE — 82075 ASSAY OF BREATH ETHANOL: CPT | Performed by: EMERGENCY MEDICINE

## 2023-01-18 PROCEDURE — 99283 EMERGENCY DEPT VISIT LOW MDM: CPT | Performed by: EMERGENCY MEDICINE

## 2023-01-18 RX ORDER — IBUPROFEN 200 MG/1
400 TABLET, FILM COATED ORAL EVERY 6 HOURS PRN
COMMUNITY
Start: 2023-01-05

## 2023-01-18 ASSESSMENT — ACTIVITIES OF DAILY LIVING (ADL)
ADLS_ACUITY_SCORE: 35
ADLS_ACUITY_SCORE: 35
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
## 2023-01-19 ENCOUNTER — PATIENT OUTREACH (OUTPATIENT)
Dept: CARE COORDINATION | Facility: CLINIC | Age: 33
End: 2023-01-19
Payer: MEDICAID
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
## 2023-01-19 NOTE — DISCHARGE INSTRUCTIONS
You need to stop drinking alcohol...!!!    I sent a referral for help with your alcohol use and for mental health therapy.    Return for concerns.

## 2023-01-19 NOTE — PROGRESS NOTES
Clinic Care Coordination Contact  [facility]/Voicemail    Referral Source: ED Follow-Up  Clinical Data: Care Coordinator Outreach  Outreach attempted x 1.  Left message on patient's voicemail with call back information and requested return call.  Plan: Care Coordinator will send care coordination introduction letter with care coordinator contact information and explanation of care coordination services via [employer]. Care Coordinator will try to reach patient again in 1-2 business days.    RUTH ANN Espino  [facility] - Care Coordinator   1/19/2023   11:52 AM  [phone]

## 2023-01-20 NOTE — PROGRESS NOTES
Clinic Care Coordination Contact  [facility]/Voicemail    Referral Source: ED Follow-Up  Clinical Data: Care Coordinator Outreach  Outreach attempted x 2.  Left message on patient's voicemail with call back information and requested return call.  Plan: Care Coordinator sent care coordination introduction letter on 1/19/23 via [employer]. Care Coordinator will do no further outreaches at this time.    RUTH ANN Espino  [facility] - Care Coordinator   1/20/2023   12:14 PM  [phone]

## 2023-03-25 ENCOUNTER — HEALTH MAINTENANCE LETTER (OUTPATIENT)
Age: 33
End: 2023-03-25

## 2023-05-01 ENCOUNTER — HOSPITAL ENCOUNTER (EMERGENCY)
Facility: CLINIC | Age: 33
Discharge: HOME | End: 2023-05-01
Attending: FAMILY MEDICINE | Admitting: FAMILY MEDICINE
Payer: MEDICAID

## 2023-05-01 VITALS
DIASTOLIC BLOOD PRESSURE: 100 MMHG | SYSTOLIC BLOOD PRESSURE: 159 MMHG | OXYGEN SATURATION: 97 % | HEART RATE: 89 BPM | TEMPERATURE: 98.2 F | RESPIRATION RATE: 20 BRPM

## 2023-05-01 DIAGNOSIS — F10.220 ACUTE ALCOHOLIC INTOXICATION IN ALCOHOLISM WITHOUT COMPLICATION (H): ICD-10-CM

## 2023-05-01 LAB — ALCOHOL BREATH TEST: 0.4 (ref 0–0.01)

## 2023-05-01 PROCEDURE — 99283 EMERGENCY DEPT VISIT LOW MDM: CPT | Performed by: FAMILY MEDICINE

## 2023-05-01 PROCEDURE — 82075 ASSAY OF BREATH ETHANOL: CPT | Performed by: FAMILY MEDICINE

## 2023-05-01 ASSESSMENT — ACTIVITIES OF DAILY LIVING (ADL): ADLS_ACUITY_SCORE: 35

## 2023-05-02 NOTE — ED PROVIDER NOTES
ED Provider Note     Wally Flores  [id number]  May 1, 2023      CC:     Chief Complaint   Patient presents with     Alcohol Intoxication          History is obtained from the patient.    HPI: Wally Flores is a 33 year old male presenting by EMS after he tried to turn himself into half-way tonight.  Patient apparently had a high breath alcohol level, and was told to come to the ER.  Patient states that he did not do anything illegal except for a DUI that occurred 4 years ago.  He was not given any notice at home but for some reason thought that he would turn himself into the police tonight.  Patient states that his alcohol levels are usually high because he does not eat when he drinks.  He will go through a case of beer every 3 days.  His last drink was a couple of hours ago.  Patient did not hurt himself today, and does not feel sick.  In fact he feels fine and would like to just go home and sleep.      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  atenolol-chlorthalidone (TENORETIC) 50-25 MG tablet, Take 1 tablet by mouth daily  DESCOVY 200-25 MG per tablet, Take 1 tablet by mouth daily  omeprazole (PRILOSEC) 20 MG DR capsule, Take 1 capsule (20 mg) by mouth daily  ondansetron (ZOFRAN ODT) 4 MG ODT tab, Take 1 tablet (4 mg) by mouth every 8 hours as needed for nausea  oxyCODONE (ROXICODONE) 5 MG tablet, Take 1 tablet (5 mg) by mouth every 6 hours as needed for severe pain (7-10)  SM IBUPROFEN 200 MG tablet, Take 400 mg by mouth every 6 hours as needed        Allergies: Penicillins and Acetaminophen    Triage and nursing notes were reviewed.    ROS: All other systems were reviewed and are negative    Physical Exam:  Vitals:    05/01/23 2304   BP: (!) 159/100   Pulse: 89   Resp: 18   Temp: 98.2  F (36.8  C)   TempSrc: Temporal   SpO2: 97%     GENERAL APPEARANCE: Alert, surprisingly conversant for his current breath alcohol level  HEAD: atraumatic  EYES: PERRL, EOMI  HENT: Normal external exam  SKIN: no rash; as above  NEURO: mentation and speech normal; no focal deficits; speech is coherent and not slurred          Labs/Imaging Results:  Results for orders placed or performed during the hospital encounter of 05/01/23 (from the past 24 hour(s))   Alcohol breath test POCT   Result Value Ref Range    Alcohol Breath Test 0.401 (A) 0.00 - 0.01           Impression:  Final diagnoses:   Acute alcoholic intoxication in alcoholism         ED Course & Medical Decision Making (Plan):  Wally Flores is a 33 year old male who tried to turn himself into the police tonight.  He had a high breath alcohol level and they ended up calling EMS to transport him to the ED.  Patient is previous records indicate alcohol levels between 0.3 and 0.5.  His last ER visit was on March 31 where his alcohol level was 0.504.  Vital signs reveal a temp of 98.2, blood pressure 159/100.  Patient is alert and oriented x3.  He was able to walk to the  bathroom and back to the room with no troubles.  Speech is remarkably coherent and nonslurred.  Patient states he would like to just go home and sleep.  We contacted his friend, Antonio, who could drive the patient home.  Patient was reinforced not to drive when he is drinking.      Written after-visit summary and instructions were given at the time of discharge.        Follow Up Plan:  [facility]  [address] [place] [id number] [phone]          Discharge Instructions:  You have chronic alcoholism with high alcohol level that you are accustomed to.  Do not drink any more alcohol tonight.  Please get help for your chronic alcoholism.  Do not ever drive after you have been drinking.  Return to the emergency department at any time if you develop new or worsening symptoms.        Disclaimer: This note consists of words and symbols derived from keyboarding and dictation using voice recognition software.  As a result, there may be errors that have gone undetected.  Please consider this when interpreting information found in this note.       Shauna Davila MD  05/01/23 5572

## 2023-05-02 NOTE — DISCHARGE INSTRUCTIONS
You have chronic alcoholism with high alcohol level that you are accustomed to.  Do not drink any more alcohol tonight.  Please get help for your chronic alcoholism.  Do not ever drive after you have been drinking.  Return to the emergency department at any time if you develop new or worsening symptoms.

## 2023-05-03 ENCOUNTER — TELEPHONE (OUTPATIENT)
Dept: BEHAVIORAL HEALTH | Facility: CLINIC | Age: 33
End: 2023-05-03

## 2023-05-03 ENCOUNTER — HOSPITAL ENCOUNTER (EMERGENCY)
Facility: CLINIC | Age: 33
Discharge: HOME | End: 2023-05-04
Attending: EMERGENCY MEDICINE | Admitting: EMERGENCY MEDICINE
Payer: MEDICAID

## 2023-05-03 DIAGNOSIS — F10.920 ALCOHOLIC INTOXICATION WITHOUT COMPLICATION (H): ICD-10-CM

## 2023-05-03 LAB
ALBUMIN SERPL BCG-MCNC: 4.8 G/DL (ref 3.5–5.2)
ALCOHOL BREATH TEST: 0.28 (ref 0–0.01)
ALCOHOL BREATH TEST: 0.35 (ref 0–0.01)
ALCOHOL BREATH TEST: 0.4 (ref 0–0.01)
ALCOHOL BREATH TEST: NORMAL (ref 0–0.01)
ALP SERPL-CCNC: 83 U/L (ref 40–129)
ALT SERPL W P-5'-P-CCNC: 86 U/L (ref 10–50)
AMPHETAMINES UR QL SCN: NORMAL
ANION GAP SERPL CALCULATED.3IONS-SCNC: 16 MMOL/L (ref 7–15)
AST SERPL W P-5'-P-CCNC: 70 U/L (ref 10–50)
BARBITURATES UR QL SCN: NORMAL
BASOPHILS # BLD AUTO: 0.1 10E3/UL (ref 0–0.2)
BASOPHILS NFR BLD AUTO: 2 %
BENZODIAZ UR QL SCN: NORMAL
BILIRUB SERPL-MCNC: 0.4 MG/DL
BUN SERPL-MCNC: 8 MG/DL (ref 6–20)
BZE UR QL SCN: NORMAL
CALCIUM SERPL-MCNC: 9.2 MG/DL (ref 8.6–10)
CANNABINOIDS UR QL SCN: NORMAL
CHLORIDE SERPL-SCNC: 105 MMOL/L (ref 98–107)
CREAT SERPL-MCNC: 0.9 MG/DL (ref 0.67–1.17)
DEPRECATED HCO3 PLAS-SCNC: 24 MMOL/L (ref 22–29)
EOSINOPHIL # BLD AUTO: 0.1 10E3/UL (ref 0–0.7)
EOSINOPHIL NFR BLD AUTO: 1 %
ERYTHROCYTE [DISTWIDTH] IN BLOOD BY AUTOMATED COUNT: 14.2 % (ref 10–15)
GFR SERPL CREATININE-BSD FRML MDRD: >90 ML/MIN/1.73M2
GLUCOSE SERPL-MCNC: 109 MG/DL (ref 70–99)
HCT VFR BLD AUTO: 48.7 % (ref 40–53)
HGB BLD-MCNC: 16.7 G/DL (ref 13.3–17.7)
HOLD SPECIMEN: NORMAL
HOLD SPECIMEN: NORMAL
IMM GRANULOCYTES # BLD: 0 10E3/UL
IMM GRANULOCYTES NFR BLD: 0 %
LIPASE SERPL-CCNC: 127 U/L (ref 13–60)
LYMPHOCYTES # BLD AUTO: 2.7 10E3/UL (ref 0.8–5.3)
LYMPHOCYTES NFR BLD AUTO: 56 %
MCH RBC QN AUTO: 32.5 PG (ref 26.5–33)
MCHC RBC AUTO-ENTMCNC: 34.3 G/DL (ref 31.5–36.5)
MCV RBC AUTO: 95 FL (ref 78–100)
MONOCYTES # BLD AUTO: 0.3 10E3/UL (ref 0–1.3)
MONOCYTES NFR BLD AUTO: 6 %
NEUTROPHILS # BLD AUTO: 1.7 10E3/UL (ref 1.6–8.3)
NEUTROPHILS NFR BLD AUTO: 35 %
NRBC # BLD AUTO: 0 10E3/UL
NRBC BLD AUTO-RTO: 0 /100
OPIATES UR QL SCN: NORMAL
PLATELET # BLD AUTO: 337 10E3/UL (ref 150–450)
POTASSIUM SERPL-SCNC: 4.1 MMOL/L (ref 3.4–5.3)
PROT SERPL-MCNC: 7.8 G/DL (ref 6.4–8.3)
RBC # BLD AUTO: 5.14 10E6/UL (ref 4.4–5.9)
SARS-COV-2 RNA RESP QL NAA+PROBE: NEGATIVE
SODIUM SERPL-SCNC: 145 MMOL/L (ref 136–145)
TROPONIN T SERPL HS-MCNC: 13 NG/L
WBC # BLD AUTO: 4.9 10E3/UL (ref 4–11)

## 2023-05-03 PROCEDURE — 93005 ELECTROCARDIOGRAM TRACING: CPT | Performed by: EMERGENCY MEDICINE

## 2023-05-03 PROCEDURE — 99285 EMERGENCY DEPT VISIT HI MDM: CPT | Mod: 25 | Performed by: EMERGENCY MEDICINE

## 2023-05-03 PROCEDURE — U0005 INFEC AGEN DETEC AMPLI PROBE: HCPCS | Performed by: EMERGENCY MEDICINE

## 2023-05-03 PROCEDURE — C9803 HOPD COVID-19 SPEC COLLECT: HCPCS | Performed by: EMERGENCY MEDICINE

## 2023-05-03 PROCEDURE — 85025 COMPLETE CBC W/AUTO DIFF WBC: CPT | Performed by: EMERGENCY MEDICINE

## 2023-05-03 PROCEDURE — 96360 HYDRATION IV INFUSION INIT: CPT | Performed by: EMERGENCY MEDICINE

## 2023-05-03 PROCEDURE — 93010 ELECTROCARDIOGRAM REPORT: CPT | Performed by: EMERGENCY MEDICINE

## 2023-05-03 PROCEDURE — 80053 COMPREHEN METABOLIC PANEL: CPT | Performed by: EMERGENCY MEDICINE

## 2023-05-03 PROCEDURE — 82075 ASSAY OF BREATH ETHANOL: CPT | Performed by: EMERGENCY MEDICINE

## 2023-05-03 PROCEDURE — 83690 ASSAY OF LIPASE: CPT | Performed by: EMERGENCY MEDICINE

## 2023-05-03 PROCEDURE — 258N000003 HC RX IP 258 OP 636: Performed by: EMERGENCY MEDICINE

## 2023-05-03 PROCEDURE — 250N000013 HC RX MED GY IP 250 OP 250 PS 637: Performed by: EMERGENCY MEDICINE

## 2023-05-03 PROCEDURE — 80307 DRUG TEST PRSMV CHEM ANLYZR: CPT | Performed by: EMERGENCY MEDICINE

## 2023-05-03 PROCEDURE — 84484 ASSAY OF TROPONIN QUANT: CPT | Performed by: EMERGENCY MEDICINE

## 2023-05-03 PROCEDURE — 36415 COLL VENOUS BLD VENIPUNCTURE: CPT | Performed by: EMERGENCY MEDICINE

## 2023-05-03 PROCEDURE — 96361 HYDRATE IV INFUSION ADD-ON: CPT | Performed by: EMERGENCY MEDICINE

## 2023-05-03 PROCEDURE — 82075 ASSAY OF BREATH ETHANOL: CPT | Mod: 91 | Performed by: EMERGENCY MEDICINE

## 2023-05-03 RX ORDER — DIAZEPAM 5 MG
5-20 TABLET ORAL EVERY 30 MIN PRN
Status: DISCONTINUED | OUTPATIENT
Start: 2023-05-03 | End: 2023-05-04 | Stop reason: HOSPADM

## 2023-05-03 RX ORDER — SODIUM CHLORIDE, SODIUM LACTATE, POTASSIUM CHLORIDE, CALCIUM CHLORIDE 600; 310; 30; 20 MG/100ML; MG/100ML; MG/100ML; MG/100ML
125 INJECTION, SOLUTION INTRAVENOUS CONTINUOUS
Status: DISCONTINUED | OUTPATIENT
Start: 2023-05-03 | End: 2023-05-04 | Stop reason: HOSPADM

## 2023-05-03 RX ORDER — FOLIC ACID 1 MG/1
1 TABLET ORAL DAILY
Status: DISCONTINUED | OUTPATIENT
Start: 2023-05-03 | End: 2023-05-04 | Stop reason: HOSPADM

## 2023-05-03 RX ORDER — MULTIPLE VITAMINS W/ MINERALS TAB 9MG-400MCG
1 TAB ORAL DAILY
Status: DISCONTINUED | OUTPATIENT
Start: 2023-05-03 | End: 2023-05-04 | Stop reason: HOSPADM

## 2023-05-03 RX ADMIN — MULTIPLE VITAMINS W/ MINERALS TAB 1 TABLET: TAB at 16:28

## 2023-05-03 RX ADMIN — SODIUM CHLORIDE, POTASSIUM CHLORIDE, SODIUM LACTATE AND CALCIUM CHLORIDE 1000 ML: 600; 310; 30; 20 INJECTION, SOLUTION INTRAVENOUS at 16:29

## 2023-05-03 RX ADMIN — SODIUM CHLORIDE, POTASSIUM CHLORIDE, SODIUM LACTATE AND CALCIUM CHLORIDE 125 ML/HR: 600; 310; 30; 20 INJECTION, SOLUTION INTRAVENOUS at 18:08

## 2023-05-03 RX ADMIN — THIAMINE HCL TAB 100 MG 100 MG: 100 TAB at 16:28

## 2023-05-03 RX ADMIN — FOLIC ACID 1 MG: 1 TABLET ORAL at 16:28

## 2023-05-03 ASSESSMENT — ACTIVITIES OF DAILY LIVING (ADL)
ADLS_ACUITY_SCORE: 35

## 2023-05-03 NOTE — ED PROVIDER NOTES
"ED Provider Note  [facility]      History     Chief Complaint   Patient presents with     Alcohol Intoxication     Pt seeking detox from etoh. Has been working with someone at [facility] to arrange treatment. Last drink 1 hr PTA. Denies hx withdrawal seizures. Denies SI, but the friend that brought him in said that pt stated \"I have nothing to live for\"     HPI  Wally Flores is a 33 year old male with a history of alcohol abuse, hypertension who presents to the emergency department seeking detox.  He reports he has been drinking 2 beers a day for the last 3 days.  However his current breath alcohol is 0.404.  On chart review it does appear that he has been drinking heavily for the last few months.  He reports his last drink was just prior to arrival.  Denies history of DTs or withdrawal seizures.  He denies other substance use.  He denies any recent falls or traumatic injury.  Denies any acute medical concerns including vomiting, chest pain, shortness of breath, abdominal pain or fevers.    He arrives with a friend who is currently trying to get him into [facility] to help with recovery.  He has a number of difficult social circumstances and is currently living with his father which is reportedly a very poor living situation.  He does not have a car and is unemployed at this time.  Additionally he reports having family conflict with his mother.  His mother reportedly told him to kill himself on Monday.  He reports he did have thoughts that there was no other way out except to kill himself.  He currently denies any suicidal ideation or intent and reports that he does not want to die.    Physical Exam   BP: (!) 134/90  Pulse: 102  Temp: 98.4  F (36.9  C)  Resp: 14  Height: 198.1 cm (6' 6\")  Weight:  (pt unable to safely  triage)  SpO2: 94 %  Physical Exam  General: patient is drowsy with slurred speech but is able to answer questions however somewhat unreliably  Head: " atraumatic and normocephalic   EENT: Tacky mucus membranes, sclera anicteric   neck: supple without meningismus  Cardiovascular: regular rate and rhythm, extremities warm and well perfused, no lower extremity edema  Pulmonary: lungs clear to auscultation bilaterally   Abdomen: soft, non-tender   Musculoskeletal: normal range of motion   Neurological: Drowsy but sitting up and answering questions, speech is slurred, no facial droop, moving all extremities symmetrically  Skin: warm, dry       ED Course, Procedures, & Data      Procedures       ED Course Selections:        EKG Interpretation:      Interpreted by Judy Valadez MD  Time reviewed: 1611  Symptoms at time of EKG: None   Rhythm: normal sinus   Rate: Normal  Axis: Normal  Ectopy: none  Conduction: normal  ST Segments/ T Waves: Non-specific ST-T wave changes  Q Waves: v1  Comparison to prior: Unchanged    Clinical Impression: no acute changes                           Results for orders placed or performed during the hospital encounter of 05/03/23   Alcohol breath test POCT     Status: Normal   Result Value Ref Range    Alcohol Breath Test error reading; too high for breathalyzer 0.00 - 0.01   Alcohol breath test POCT     Status: Abnormal   Result Value Ref Range    Alcohol Breath Test 0.404 (A) 0.00 - 0.01     Medications   diazepam (VALIUM) tablet 5-20 mg (has no administration in time range)   thiamine (B-1) tablet 100 mg (has no administration in time range)   folic acid (FOLVITE) tablet 1 mg (has no administration in time range)   multivitamin w/minerals (THERA-VIT-M) tablet 1 tablet (has no administration in time range)     Labs Ordered and Resulted from Time of ED Arrival to Time of ED Departure   ALCOHOL BREATH TEST POCT - Abnormal       Result Value    Alcohol Breath Test 0.404 (*)    ALCOHOL BREATH TEST POCT - Normal    Alcohol Breath Test error reading; too high for breathalyzer     COVID-19 VIRUS (CORONAVIRUS) BY PCR   COMPREHENSIVE  METABOLIC PANEL   LIPASE     No orders to display          Critical care was not performed.     Medical Decision Making  The patient's presentation was of high complexity (a chronic illness severe exacerbation, progression, or side effect of treatment).    The patient's evaluation involved:  review of external note(s) from 1 sources (ED notes)  ordering and/or review of 3+ test(s) in this encounter (see separate area of note for details)  review of 1 test result(s) ordered prior to this encounter (labs)    The patient's management necessitated moderate risk (prescription drug management including medications given in the ED) and high risk (a decision regarding hospitalization).      Assessment & Plan    Mr. Flores is a 33 year old male with a history of alcohol abuse, hypertension who presents to the emergency department seeking detox.  He is noted to be mildly hypertensive and tachycardic otherwise afebrile and in no respiratory distress.  He is quite intoxicated with a breath alcohol of 0.404.  On exam he does not have external evidence of traumatic injury and denies recent falls.  He denies any acute medical concerns.  Baseline laboratory evaluation completed which shows ALT of 86, AST 70, normal bilirubin, lipase of 127.  He reports he had a heart attack at the beginning of April however I am unable to see any documentation.  He was seen in the ED but there is no mention of any cardiac issue during his stay.  He did have an ECG which shows no acute ischemic changes and troponin is 13.  Denies any chest pain or shortness of breath currently.  Patient was given IV fluids, thiamine, folate and a multivitamin.  He was started on MSSA protocol.  Additionally he does report history of depression and has been feeling particularly down recently with his alcohol use and living situation.  He denies any current suicidal ideation, plan or intent.  Patient is voluntary for detox and will plan to admit for further  management.    Reevaluation: Patient reports that he received a call from [facility] and there is a bed available for him there.  He would prefer to be admitted to [facility] for detox.  We did call and confirm that a bed is available for him and nursing report was given.  He was observed in the emergency department for a number of hours with clearing of mental status.  He is ambulating independently, tolerating p.o. and has capacity.  Patient will be discharged with transportation to go to [facility] to complete further detox management.    I have reviewed the nursing notes. I have reviewed the findings, diagnosis, plan and need for follow up with the patient.    Discharge Medication List as of 5/4/2023 12:13 AM          Final diagnoses:   Alcoholic intoxication without complication (H)       Judy Whitney MD  [facility] EMERGENCY DEPARTMENT  5/3/2023     Judy Whitney MD  05/04/23 1006

## 2023-05-03 NOTE — ED TRIAGE NOTES
"Alcohol Intoxication Pt seeking detox from etoh. Has been working with someone at [facility] to arrange treatment. Last drink 1 hr PTA. Denies hx withdrawal seizures. Denies SI, but the friend that brought him in said that pt stated \"I have nothing to live for\"          Triage Assessment     Row Name 05/03/23 1532       Triage Assessment (Adult)    Airway WDL WDL       Respiratory WDL    Respiratory WDL WDL       Skin Circulation/Temperature WDL    Skin Circulation/Temperature WDL WDL       Cardiac WDL    Cardiac WDL WDL       Peripheral/Neurovascular WDL    Peripheral Neurovascular WDL WDL       Cognitive/Neuro/Behavioral WDL    Cognitive/Neuro/Behavioral WDL WDL              "

## 2023-05-04 VITALS
HEART RATE: 96 BPM | OXYGEN SATURATION: 98 % | RESPIRATION RATE: 18 BRPM | HEIGHT: 78 IN | BODY MASS INDEX: 24.27 KG/M2 | DIASTOLIC BLOOD PRESSURE: 94 MMHG | TEMPERATURE: 96.6 F | SYSTOLIC BLOOD PRESSURE: 176 MMHG

## 2023-05-04 LAB
ATRIAL RATE - MUSE: 394 BPM
DIASTOLIC BLOOD PRESSURE - MUSE: NORMAL MMHG
INTERPRETATION ECG - MUSE: NORMAL
P AXIS - MUSE: 36 DEGREES
PR INTERVAL - MUSE: NORMAL MS
QRS DURATION - MUSE: 110 MS
QT - MUSE: 370 MS
QTC - MUSE: 434 MS
R AXIS - MUSE: 45 DEGREES
SYSTOLIC BLOOD PRESSURE - MUSE: NORMAL MMHG
T AXIS - MUSE: 20 DEGREES
VENTRICULAR RATE- MUSE: 83 BPM

## 2023-05-04 NOTE — MEDICATION SCRIBE - ADMISSION MEDICATION HISTORY
Med Scribe Admission Medication History    Admission medication history is complete. The information provided in this note is only as accurate as the sources available at the time of the update.    Medication reconciliation/reorder completed by provider prior to medication history? yes    Information Source(s): chart review and interview with pt in [facility]    Pertinent Information: no fill hx as pt hasn't had insurance recently    Changes made to PTA medication list:    Added: none    Deleted: Tenoretic, omeprazole, zofran, oxycodone    Changed: none    Medication Affordability: yes       Allergies reviewed with patient and updates made in EHR: yes    Medication History Completed By: Karen Sainz 5/3/2023 8:47 PM    Prior to Admission medications    Medication Sig Last Dose Taking? Auth Provider Long Term End Date   DESCOVY 200-25 MG per tablet Take 1 tablet by mouth daily 5/2/2023 Yes Reported, Patient     SM IBUPROFEN 200 MG tablet Take 400 mg by mouth every 6 hours as needed Past Week Yes Reported, Patient     atenolol-chlorthalidone (TENORETIC) 50-25 MG tablet Take 1 tablet by mouth daily   Fortunato Parker MD Yes    omeprazole (PRILOSEC) 20 MG DR capsule Take 1 capsule (20 mg) by mouth daily   Macho Kan MD     ondansetron (ZOFRAN ODT) 4 MG ODT tab Take 1 tablet (4 mg) by mouth every 8 hours as needed for nausea   Macho Kan MD     oxyCODONE (ROXICODONE) 5 MG tablet Take 1 tablet (5 mg) by mouth every 6 hours as needed for severe pain (7-10)   Macho Kan MD

## 2023-05-04 NOTE — TELEPHONE ENCOUNTER
10:09pm Intake received a call from provider Ariela reporting this pt will not be admitting to detox. They will be going [facility].

## 2023-05-04 NOTE — TELEPHONE ENCOUNTER
S: [facility] , Provider Dr. Valadez calling at 7:26pm with clinical on a 33 year old/Male presenting for alcohol detox.     B: Pt presents for ETOH detox.   Currently reports drinking 2 to 4 beers daily for the last 3 days, but he came in with a 0.404 BAL. Pt has couple visits in the ED, and in the last several months. Per chart, it seems he's been drinking for a couple months.  Last drink was PTA.  Denies a hx of w/d seizures or DTs.  Denies substance use.     Has a hx of depression, made a comment that it s better that he .  Denies mh concerns.  No medical concerns.  Labs are back and look good.  COVID is negative.  Ambulating.  Has MSSA ordered but has not received any valium yet.  He gets a lot of shakiness, GI upset, tremulous, anxiety when he is withdrawing.  Vol.  Medically cleared.     Patient reports last use was prior to arrival..  Pt KAEL: 0.404 at 3:54pm.   Pt  denies hx of DT  Pt  denies hx of seizures. Last seizure: N/A  Pt endorsing the following symptoms of withdrawal: Shaky, tremulous, GI upset, anxious  MSSA Score: still intoxicated    Pt denies acute mental health or medical concerns.   Pt denies other drug use: None Amount/frequency: N/A    Does Pt have a detox care plan in [facility]? No  Does pt present with specific needs, assistive devices, or exclusionary criteria? None  Is the patient ambulating, eating and drinking in the ED? Yes    A: Pt meets criteria to be presented for IP detox admission. Patient is voluntary  COVID: Negative  Utox: Ordered, not yet collected  CMP: Abnormalities: AST70, ALT86  CBC: WNL  HCG: N/A  LIPASE: 127!    R: Patient cleared and ready for behavioral bed placement: Yes    Presenting for admission to 3A/CD     9:11pm- Dr. Rosario accepts for [facility]A/[facility].  CD Admit.

## 2023-05-04 NOTE — ED NOTES
Pt was updated on the ETA of [facility], now pt is demanding to leave. Pt is not redirectable, pt placed donavan hold. Copy was given to the pt and to Security

## 2023-05-04 NOTE — ED NOTES
Writer Called [facility] and gave report regarding pt. They accepted the report and writer sent transport to that facility. Pt is aware that he needs to go via our transport instead of his uber. DR Valadez is aware of the process. Writer also called [facility] and notified them that pt will not be admitted to that floor. Patient Placement also notified.

## 2023-05-04 NOTE — DISCHARGE INSTRUCTIONS
Go to [facility] where a bed is on hold for you to pursue further withdrawal management.

## 2023-09-15 NOTE — LETTER
4/19/2019         RE: Wally Flores  [address] [address]        Dear Colleague,    Thank you for referring your patient, Wally Flores, to the [facility]. Please see a copy of my visit note below.    PROCEDURE: excision of subcutaneous mass of the left cheek and right frontal scalp   Written consent was obtained    The left cheek area was prepped and appropriately anesthetized with 1% lidocaine with epinephrine. Using the usual technique, excision of subcutaneous mass was performed. The total area excised, including lesion and margins was 2cm x 1cm x 1cm.  Closure was accomplished with interrupted 3-0 vicryl for the dermal layer and running subcuticular 4-0 monocryl for the skin. Total length of the incision after closure was 2.5 cm. An appropriate  dressing was applied.  The procedure was well tolerated and without complications. Specimen was sent to Pathology.    The right frontal scalp area was prepped and appropriately anesthetized with 1% lidocaine with epinephrine. Using the usual technique, excision of subcutaneous mass was performed. The total area excised, including lesion and margins was 0.8 cm.  Closure was accomplished with interrupted 3-0 vicryl for the dermal layer and two inverted subcuticular 4-0 monocryl for the skin. Total length of the incision after closure was 1 cm. An appropriate  dressing was applied.  The procedure was well tolerated and without complications. Specimen was sent to Pathology.            Again, thank you for allowing me to participate in the care of your patient.        Sincerely,        Gadiel Yung, DO    
  Wally Flores  [address] [address]    April 26, 2020      Dear Wally,  This letter is to inform you of the results of your pathology report from your excisions.  Your pathology report was:  FINAL DIAGNOSIS:   A. Skin, left face:   - Infundibular cyst (epidermal inclusion cyst)    B. Skin, scalp:   - Pilar cyst with partial rupture  These are both benign, non-concerning subcutaneous conditions. They should both be completely excised and therefore should not reoccur, but you should pay attention to new lumps in other locations.  If you have further questions please don t hesitate to call our clinic at [phone].   Sincerely,     Gadiel Yung, DO                  
For information on Fall & Injury Prevention, visit: [email or web address] OR  [email or web address] OR  https://www.cdc.gov/steadi/patient.html

## 2024-05-25 ENCOUNTER — HEALTH MAINTENANCE LETTER (OUTPATIENT)
Age: 34
End: 2024-05-25